# Patient Record
Sex: FEMALE | Race: ASIAN | Employment: OTHER | URBAN - METROPOLITAN AREA
[De-identification: names, ages, dates, MRNs, and addresses within clinical notes are randomized per-mention and may not be internally consistent; named-entity substitution may affect disease eponyms.]

---

## 2017-01-06 ENCOUNTER — OFFICE VISIT (OUTPATIENT)
Dept: RHEUMATOLOGY | Facility: CLINIC | Age: 67
End: 2017-01-06

## 2017-01-06 VITALS
DIASTOLIC BLOOD PRESSURE: 84 MMHG | HEART RATE: 76 BPM | WEIGHT: 138 LBS | RESPIRATION RATE: 16 BRPM | SYSTOLIC BLOOD PRESSURE: 122 MMHG | BODY MASS INDEX: 28 KG/M2

## 2017-01-06 DIAGNOSIS — D46.9 MYELODYSPLASTIC SYNDROME (HCC): ICD-10-CM

## 2017-01-06 DIAGNOSIS — M06.09 SERONEGATIVE RHEUMATOID ARTHRITIS OF MULTIPLE SITES (HCC): Primary | ICD-10-CM

## 2017-01-06 DIAGNOSIS — L30.9 DERMATITIS: ICD-10-CM

## 2017-01-06 DIAGNOSIS — R60.0 BILATERAL LEG EDEMA: ICD-10-CM

## 2017-01-06 PROCEDURE — 99214 OFFICE O/P EST MOD 30 MIN: CPT | Performed by: INTERNAL MEDICINE

## 2017-01-06 RX ORDER — PREDNISONE 1 MG/1
TABLET ORAL
Qty: 100 TABLET | Refills: 3 | Status: SHIPPED | OUTPATIENT
Start: 2017-01-06 | End: 2017-02-10

## 2017-01-06 RX ORDER — LOSARTAN POTASSIUM AND HYDROCHLOROTHIAZIDE 12.5; 5 MG/1; MG/1
1 TABLET ORAL DAILY
Qty: 30 TABLET | Refills: 3 | Status: SHIPPED | OUTPATIENT
Start: 2017-01-06 | End: 2017-02-10

## 2017-01-06 NOTE — PROGRESS NOTES
[facility]  Dr. Mercedes Gill Progress Note     Subjective:   Homer Diehl is a(n) 78year old female. Current complaints: Patient presents with: Follow - Up: Pt here for one month f/u.  Pt's mouth sore is better but now has bilateral lower leg/ankle/feet rheumatoid arthritis is stable. However a new problem is present, there is swelling in the lower legs with a lower leg skin rash.   The skin rash might represent some type of allergic reaction or something else called vasculitis which means inflamed blood

## 2017-01-06 NOTE — PATIENT INSTRUCTIONS
Currently the rheumatoid arthritis is stable. However a new problem is present, there is swelling in the lower legs with a lower leg skin rash.   The skin rash might represent some type of allergic reaction or something else called vasculitis which means i

## 2017-02-07 ENCOUNTER — TELEPHONE (OUTPATIENT)
Dept: RHEUMATOLOGY | Facility: CLINIC | Age: 67
End: 2017-02-07

## 2017-02-10 ENCOUNTER — OFFICE VISIT (OUTPATIENT)
Dept: RHEUMATOLOGY | Facility: CLINIC | Age: 67
End: 2017-02-10

## 2017-02-10 VITALS
DIASTOLIC BLOOD PRESSURE: 62 MMHG | SYSTOLIC BLOOD PRESSURE: 102 MMHG | RESPIRATION RATE: 12 BRPM | WEIGHT: 142.5 LBS | BODY MASS INDEX: 29 KG/M2 | HEART RATE: 68 BPM

## 2017-02-10 DIAGNOSIS — M06.00 SERONEGATIVE RHEUMATOID ARTHRITIS (HCC): ICD-10-CM

## 2017-02-10 DIAGNOSIS — M06.09 SERONEGATIVE RHEUMATOID ARTHRITIS OF MULTIPLE SITES (HCC): Primary | ICD-10-CM

## 2017-02-10 PROCEDURE — 99213 OFFICE O/P EST LOW 20 MIN: CPT | Performed by: INTERNAL MEDICINE

## 2017-02-10 RX ORDER — LOSARTAN POTASSIUM AND HYDROCHLOROTHIAZIDE 12.5; 5 MG/1; MG/1
1 TABLET ORAL DAILY
Qty: 90 TABLET | Refills: 3 | Status: SHIPPED | OUTPATIENT
Start: 2017-02-10 | End: 2018-11-09 | Stop reason: ALTCHOICE

## 2017-02-10 RX ORDER — PREDNISONE 1 MG/1
TABLET ORAL
Qty: 180 TABLET | Refills: 3 | Status: SHIPPED | OUTPATIENT
Start: 2017-02-10 | End: 2017-10-18

## 2017-02-10 RX ORDER — FOLIC ACID 1 MG/1
2 TABLET ORAL
Qty: 180 TABLET | Refills: 4 | Status: SHIPPED | OUTPATIENT
Start: 2017-02-10 | End: 2018-02-19

## 2017-02-10 NOTE — PROGRESS NOTES
[facility]  Dr. Huizar Qualia Progress Note     Subjective:   Melva Diaz is a(n) 78year old female. Current complaints: Patient presents with: Follow - Up: seronegative RA f/u. Pt states 'is feeling much better.  Swelling/redness has gone down in lower hemoglobin. Blood sugar was slightly borderline at 141. Continue eat a well-balanced diet and do not overeat your sugar. Return to see Dr. Gaye Luna in 3 months.       Hillary Kerr MD [id number]/77/6450 2:54 PM

## 2017-02-10 NOTE — PATIENT INSTRUCTIONS
Current plan is to take prednisone 5 mg tablets 2 per day. This is for treatment of arthritis. Take losartan 50 mg plus hydrochlorothiazide 1 per day for treatment of blood pressure.   Take the folic acid 1 mg tablets 2 daily which helps for vitamin defic

## 2017-10-18 ENCOUNTER — OFFICE VISIT (OUTPATIENT)
Dept: RHEUMATOLOGY | Facility: CLINIC | Age: 67
End: 2017-10-18

## 2017-10-18 ENCOUNTER — TELEPHONE (OUTPATIENT)
Dept: RHEUMATOLOGY | Facility: CLINIC | Age: 67
End: 2017-10-18

## 2017-10-18 VITALS
HEART RATE: 68 BPM | BODY MASS INDEX: 33 KG/M2 | DIASTOLIC BLOOD PRESSURE: 82 MMHG | WEIGHT: 163 LBS | RESPIRATION RATE: 12 BRPM | SYSTOLIC BLOOD PRESSURE: 142 MMHG

## 2017-10-18 DIAGNOSIS — D46.9 MYELODYSPLASTIC SYNDROME (HCC): ICD-10-CM

## 2017-10-18 DIAGNOSIS — M06.09 SERONEGATIVE RHEUMATOID ARTHRITIS OF MULTIPLE SITES (HCC): Primary | ICD-10-CM

## 2017-10-18 PROCEDURE — 99213 OFFICE O/P EST LOW 20 MIN: CPT | Performed by: INTERNAL MEDICINE

## 2017-10-18 RX ORDER — PREDNISONE 1 MG/1
TABLET ORAL
Qty: 180 TABLET | Refills: 3 | Status: SHIPPED | OUTPATIENT
Start: 2017-10-18 | End: 2018-02-19 | Stop reason: ALTCHOICE

## 2017-10-18 RX ORDER — FOLIC ACID 1 MG/1
2 TABLET ORAL
Qty: 180 TABLET | Refills: 4 | Status: CANCELLED | OUTPATIENT
Start: 2017-10-18

## 2017-10-18 NOTE — PROGRESS NOTES
[facility]  Dr. Mercedes Gill Progress Note     Subjective:   Homer Diehl is a(n) 75year old female.    Current complaints: Patient presents with:  Rheumatoid Arthritis: LOV Pt c/o left swollen thumb, left swollen middle finger, right wrist swelling  Swelli for muscle cramps in your lower leg as needed. The atorvastatin is used to lower the cholesterol. You should also follow a low-fat diet. The Metformin helps to control the blood sugar take that daily.   Return to see Dr. Karlee Aburto in 4 months unless there i

## 2017-10-18 NOTE — PATIENT INSTRUCTIONS
Current advice is to continue on prednisone 5 mg twice a day to treat the arthritis. Also use the naproxen 500 mg twice a day for arthritis from Dr. Jazmin Tinoco. Finish the vitamin folic acid 1 mg per day but do not renew it once finished.   Folic acid was meant

## 2017-10-18 NOTE — TELEPHONE ENCOUNTER
Tried to add this pharmacy to Pt acct and would not let me  [address] [id number] Phone: [phone] Fax: [phone]

## 2018-02-19 ENCOUNTER — APPOINTMENT (OUTPATIENT)
Dept: LAB | Age: 68
End: 2018-02-19
Attending: INTERNAL MEDICINE
Payer: MEDICARE

## 2018-02-19 ENCOUNTER — OFFICE VISIT (OUTPATIENT)
Dept: RHEUMATOLOGY | Facility: CLINIC | Age: 68
End: 2018-02-19

## 2018-02-19 VITALS
DIASTOLIC BLOOD PRESSURE: 80 MMHG | HEIGHT: 60 IN | BODY MASS INDEX: 31.02 KG/M2 | RESPIRATION RATE: 20 BRPM | HEART RATE: 78 BPM | SYSTOLIC BLOOD PRESSURE: 180 MMHG | WEIGHT: 158 LBS

## 2018-02-19 DIAGNOSIS — M06.09 SERONEGATIVE RHEUMATOID ARTHRITIS OF MULTIPLE SITES (HCC): Primary | ICD-10-CM

## 2018-02-19 DIAGNOSIS — Z79.4 TYPE 2 DIABETES MELLITUS WITHOUT COMPLICATION, WITH LONG-TERM CURRENT USE OF INSULIN (HCC): ICD-10-CM

## 2018-02-19 DIAGNOSIS — M06.00 SERONEGATIVE RHEUMATOID ARTHRITIS (HCC): ICD-10-CM

## 2018-02-19 DIAGNOSIS — E11.9 TYPE 2 DIABETES MELLITUS WITHOUT COMPLICATION, WITH LONG-TERM CURRENT USE OF INSULIN (HCC): ICD-10-CM

## 2018-02-19 DIAGNOSIS — M06.09 SERONEGATIVE RHEUMATOID ARTHRITIS OF MULTIPLE SITES (HCC): ICD-10-CM

## 2018-02-19 LAB
ALBUMIN SERPL-MCNC: 3.6 G/DL (ref 3.5–4.8)
ALP LIVER SERPL-CCNC: 113 U/L (ref 55–142)
ALT SERPL-CCNC: 40 U/L (ref 14–54)
AST SERPL-CCNC: 33 U/L (ref 15–41)
BASOPHILS # BLD AUTO: 0.02 X10(3) UL (ref 0–0.1)
BASOPHILS NFR BLD AUTO: 0.4 %
BILIRUB SERPL-MCNC: 0.7 MG/DL (ref 0.1–2)
BUN BLD-MCNC: 22 MG/DL (ref 8–20)
C-REACTIVE PROTEIN: 0.4 MG/DL (ref ?–1)
CALCIUM BLD-MCNC: 10.1 MG/DL (ref 8.3–10.3)
CHLORIDE: 110 MMOL/L (ref 101–111)
CO2: 23 MMOL/L (ref 22–32)
CREAT BLD-MCNC: 0.96 MG/DL (ref 0.55–1.02)
EOSINOPHIL # BLD AUTO: 0.08 X10(3) UL (ref 0–0.3)
EOSINOPHIL NFR BLD AUTO: 1.4 %
ERYTHROCYTE [DISTWIDTH] IN BLOOD BY AUTOMATED COUNT: 12.9 % (ref 11.5–16)
GLUCOSE BLD-MCNC: 96 MG/DL (ref 70–99)
HCT VFR BLD AUTO: 36.8 % (ref 34–50)
HGB BLD-MCNC: 11.7 G/DL (ref 12–16)
IMMATURE GRANULOCYTE COUNT: 0.01 X10(3) UL (ref 0–1)
IMMATURE GRANULOCYTE RATIO %: 0.2 %
LYMPHOCYTES # BLD AUTO: 1.53 X10(3) UL (ref 0.9–4)
LYMPHOCYTES NFR BLD AUTO: 27.2 %
M PROTEIN MFR SERPL ELPH: 8.3 G/DL (ref 6.1–8.3)
MCH RBC QN AUTO: 29.3 PG (ref 27–33.2)
MCHC RBC AUTO-ENTMCNC: 31.8 G/DL (ref 31–37)
MCV RBC AUTO: 92.2 FL (ref 81–100)
MONOCYTES # BLD AUTO: 0.62 X10(3) UL (ref 0.1–1)
MONOCYTES NFR BLD AUTO: 11 %
NEUTROPHIL ABS PRELIM: 3.36 X10 (3) UL (ref 1.3–6.7)
NEUTROPHILS # BLD AUTO: 3.36 X10(3) UL (ref 1.3–6.7)
NEUTROPHILS NFR BLD AUTO: 59.8 %
PLATELET # BLD AUTO: 105 10(3)UL (ref 150–450)
POTASSIUM SERPL-SCNC: 4 MMOL/L (ref 3.6–5.1)
RBC # BLD AUTO: 3.99 X10(6)UL (ref 3.8–5.1)
RED CELL DISTRIBUTION WIDTH-SD: 43.3 FL (ref 35.1–46.3)
RHEUMATOID FACT SERPL-ACNC: 15 IU/ML (ref ?–15)
SED RATE-ML: 75 MM/HR (ref 0–25)
SODIUM SERPL-SCNC: 141 MMOL/L (ref 136–144)
WBC # BLD AUTO: 5.6 X10(3) UL (ref 4–13)

## 2018-02-19 PROCEDURE — 86431 RHEUMATOID FACTOR QUANT: CPT | Performed by: INTERNAL MEDICINE

## 2018-02-19 PROCEDURE — 99214 OFFICE O/P EST MOD 30 MIN: CPT | Performed by: INTERNAL MEDICINE

## 2018-02-19 PROCEDURE — 86140 C-REACTIVE PROTEIN: CPT | Performed by: INTERNAL MEDICINE

## 2018-02-19 PROCEDURE — 86038 ANTINUCLEAR ANTIBODIES: CPT | Performed by: INTERNAL MEDICINE

## 2018-02-19 PROCEDURE — 85652 RBC SED RATE AUTOMATED: CPT | Performed by: INTERNAL MEDICINE

## 2018-02-19 PROCEDURE — 85025 COMPLETE CBC W/AUTO DIFF WBC: CPT | Performed by: INTERNAL MEDICINE

## 2018-02-19 PROCEDURE — 80053 COMPREHEN METABOLIC PANEL: CPT | Performed by: INTERNAL MEDICINE

## 2018-02-19 PROCEDURE — 86200 CCP ANTIBODY: CPT

## 2018-02-19 RX ORDER — AMLODIPINE BESYLATE 5 MG/1
5 TABLET ORAL DAILY
COMMUNITY
End: 2019-04-25

## 2018-02-19 RX ORDER — INSULIN ASPART 100 [IU]/ML
INJECTION, SOLUTION INTRAVENOUS; SUBCUTANEOUS
COMMUNITY

## 2018-02-19 RX ORDER — MELOXICAM 15 MG/1
15 TABLET ORAL DAILY
Qty: 30 TABLET | Refills: 3 | Status: SHIPPED | OUTPATIENT
Start: 2018-02-19 | End: 2018-04-02

## 2018-02-19 RX ORDER — FOLIC ACID 1 MG/1
1 TABLET ORAL
Qty: 90 TABLET | Refills: 3 | Status: SHIPPED | OUTPATIENT
Start: 2018-02-19 | End: 2018-11-09

## 2018-02-19 RX ORDER — METHOTREXATE 2.5 MG/1
10 TABLET ORAL WEEKLY
Qty: 20 TABLET | Refills: 3 | Status: SHIPPED | OUTPATIENT
Start: 2018-02-19 | End: 2018-03-05

## 2018-02-19 NOTE — PROGRESS NOTES
[facility]  Dr. Rome Cui Progress Note     Subjective:   Eulogio Bernstein is a(n) 75year old female. Current complaints: Patient presents with:  Rheumatoid Arthritis: 4 month f/u. Pt states bilateral hands worsen with pain/swelling-unable to .  Blood methotrexate. Plan:   Patient Instructions   Current plan is to discontinue prednisone. Do not use prednisone because it causes diabetes to worsen. For the rheumatoid arthritis use meloxicam 15 mg once a day.   Take the methotrexate 2.5 mg tablets, 4 tab

## 2018-02-19 NOTE — PATIENT INSTRUCTIONS
Current plan is to discontinue prednisone. Do not use prednisone because it causes diabetes to worsen. For the rheumatoid arthritis use meloxicam 15 mg once a day. Take the methotrexate 2.5 mg tablets, 4 tablets once a week.   Take all 4 tablets at once

## 2018-02-21 LAB — ANA SCREEN: NEGATIVE

## 2018-03-01 NOTE — LETTER
OPIOID TREATMENT AGREEMENT    For Pain Management      Please read each statement, initial at the bottom of each page, and sign the last page to indicate your agreement with this form. If you have any questions about any information in this form or the opioid treatment plan, please request immediate clarification from your physician or health care provider.     The purpose of this agreement is to give you information about the medications you will be taking for pain management and to assure that you and your physician/health care provider comply with state and federal regulations concerning the prescribing of controlled substances. A trial of opioid therapy can be considered for moderate to severe pain with the intent of reducing pain and increasing function. The physician’s goal is for you to have the best quality of life possible given the reality of your clinical condition. The success of treatment depends on mutual trust and honesty in the physician/patient relationship and full agreement and understanding of the risks and benefits of using opioids to treat pain.    I agree to use opioids (morphine-like drugs) as part of my treatment for chronic pain.  I understand that these drugs can be effective, but have a high potential for misuse and are therefore closely controlled by the local, state, and federal government. I understand that my physician/health care provider is prescribing such medication to help manage my pain, and I agree to the following conditions as part of my treatment plan    I am responsible for my pain medications.  I agree to take the medication only as prescribed.    I understand that increasing my dose without the close supervision of my physician could    lead to drug overdose causing severe sedation and respiratory depression and death.    I understand that decreasing or stopping my medication without the close supervision of my physician can lead to withdrawal. Withdrawal symptoms can  Procedure start   include yawning, sweating, watery eyes, runny nose, anxiety, tremors, aching muscles, hot and cold flashes, “goose bumps”, abdominal cramps, and diarrhea.  These symptoms can occur 24-48 hours after the last dose and can last up to 3 weeks.    I will not request or accept controlled substance medication from any other physician or individual while I am receiving such medication from my physician/health care provider at the clinic.    I acknowledge that there are side effects with opioid therapy, and I understand it is my responsibility to notify my physician/health care provider for any side effect that continue or are severe (i.e., difficulty breathing, slow heart rate, sedation, confusion).  I am also responsible for notifying my pain physician immediately if I need to visit another physician or need to visit an emergency room due to pain, of if I become pregnant.    I understand that the opioid medication is strictly for my own use and I agree not to give or sell my medication to others because it may endanger another person’s health and is against the law.    I will inform my physician of all medications I am taking, including herbal remedies.  Medications like Valium or Ativan; sedatives such as Soma, Xanax, Fiorinal; antihistamines like Benadryl; herbal remedies, alcohol, and cough syrup containing alcohol, codeine, or hydrocodone can interact with opioids and produce serious side effects.    I understand I will be expected to return to the clinic as instructed by my provider during the time any of my medication is being adjusted.    I understand that any evidence of drug hoarding, acquisition of any opioid medication or adjunctive analgesia from other physicians (which includes emergency rooms), uncontrolled dose escalation or reduction, loss of prescriptions or failure to follow agreement may result in change to the treatment plan, referral to the Medication Assisted Therapy Program, an may result in  termination of the physician/patient relationship.    I will not use any illicit substances, such as cocaine, marijuana, etc. while taking these medications.  I understand that use of any illicit substances while taking these medications may result in a change to my treatment plan, referral to the Medication Assisted Therapy Clinic, and may result in termination of the physician/patient relationship.    I understand that I should not consume alcohol while taking these medications because the use of alcohol together with opioid medications is warned against.    I am responsible for my opioid prescriptions.  I understand that:    Refill prescriptions can be written for a one month supply and increased to a maximum of two months at the discretion of the provider.    It is my responsibility to schedule appointments for the next opioid refill to ensure I do not run out of medications.    I am responsible for keeping my prescriptions and pain medications in a safe and secure place, such as a locked cabinet or safe.  I am expected to protect my medications from loss or theft.  I am responsible for taking the medication in the dose prescribed and for keeping track of the amount remaining.  If my medication is stolen, I will report this to my local police department and obtain a stolen item report.  I will then report the stolen medication to my physician.  If my medications are lost, misplaced, or stolen, my physician may choose not to replace the medications.    Refills issued by physicians/health care providers in this clinic can only be filled by a pharmacy in the State of [place], even if I am a resident of another state.    Prescriptions for pain medicine or any other prescriptions will be written only during an office visit or during regular office hours.  No refills of any medications during the evening or on weekends.    I must bring back all opioid medications and adjunctive medications prescribed by my physician  in the original containers/bottles at every visit.    Prescriptions will not be written in advance due to vacations, meetings, or other commitments.    If an appointment for a prescription refill is missed, another appointment will be made as soon as possible.  Immediate or emergency appointments will not be possible.    I understand while physical dependence is to be expected after long-term use of opioids, signs of addiction, abuse, or misuse shall prompt the need for substance dependence treatment as well as weaning and detoxification from the opioids.  I further understand the following:    Physical dependence is common to many drugs such as blood pressure medications, anti-seizure medications, and opioids.  It results in biochemical changes such that abruptly stopping these drugs will cause a withdrawal response.  It should be noted that physical dependence does not equal addiction.  A person can be dependent on insulin to treat diabetes or dependent on prednisone (steroids) to treat asthma, but not addicted to the insulin or prednisone.    Addiction is a primary, chronic neurobiologic disease with genetic, psychosocial and environmental factors influencing its development and manifestation.  It is characterized by behavior that includes one or more of the following: impaired control over drug use, compulsive use, continued use despite harm, and cravings.  This means the drug decreases a person’s quality of life.  If a patient exhibits such behavior, the drug will be tapered and the patient will not be a candidate for an opioid trial.  He/she will be referred to an addiction medicine specialist.    Tolerance means a state of adaption in which exposure to the drug induces changes that result in a lessening of one or more of the drug’s effects over time.  The dose of the opioid may have to be adjusted up or down to a dose that produces maximum function and a realistic decrease of the patient’s pain.    If it  appears to my physician/health care provider that there is no improvement in my daily function or quality of life from the controlled substance, I understand my opioids may be discontinued.    If I have a history of alcohol or drug misuse/addiction, I must notify the physician of such history since the treatment with opioids for may increase the possibility of relapse.    I agree and understand that my physician reserves the right to perform random or unannounced urine drug testing.  If requested to provide a urine sample, I agree to cooperate.  If I decide not to provide a urine sample, I understand that my physician may change my treatment plan, including discontinuation of my opioid medications when applicable or complete termination of the physician/patient relationship.  The presence of non-prescribed drug(s) or illicit drug(s) in the urine can be grounds for termination of the physician/patient relationship.  Urine drug testing is not forensic testing, but is done for my benefit as a diagnostic tool and in accordance with certain legal and regulatory guidance on the use of controlled substances to treat pain.    I agree to allow my physician/albertina care provider to contact any health care professional, including behavioral health, family member, pharmacy, legal authority, or regulator agency to obtain or provide information about my care or actions if the physician feels it is necessary.    I understand that non-compliance with the above conditions may result in a re-evaluation of my treatment plan, referral to the Medication [facility]isted Therapy Clinic, discontinuation of opioid therapy, and possible discharge from the clinic.    I agree to work closely with my physician/health care provider to assure the agreed plan of care is followed.    I acknowledge that I have received a copy of this agreement for my records.          I __________________________________________ have read the above information or it has been  read to me.  All my questions regarding the treatment of pain with opioids have been answered to my satisfaction, and I understand all of the conditions of my participation in the opioid treatment plan listed above.  I hereby agree to the conditions listed about and consent to participate in the opioid medication therapy.        ______________________________    ____________    ______________________________              Patient Signature                                  Date                        Patient Printed Name  ______________________________    ____________    ______________________________              Witness Signature                                Date                       Witness Printed Name

## 2018-04-02 ENCOUNTER — OFFICE VISIT (OUTPATIENT)
Dept: RHEUMATOLOGY | Facility: CLINIC | Age: 68
End: 2018-04-02

## 2018-04-02 VITALS
SYSTOLIC BLOOD PRESSURE: 122 MMHG | RESPIRATION RATE: 12 BRPM | DIASTOLIC BLOOD PRESSURE: 78 MMHG | BODY MASS INDEX: 30 KG/M2 | WEIGHT: 153 LBS

## 2018-04-02 DIAGNOSIS — M06.09 SERONEGATIVE RHEUMATOID ARTHRITIS OF MULTIPLE SITES (HCC): Primary | ICD-10-CM

## 2018-04-02 PROCEDURE — 99213 OFFICE O/P EST LOW 20 MIN: CPT | Performed by: INTERNAL MEDICINE

## 2018-04-02 RX ORDER — FOLIC ACID 1 MG/1
1 TABLET ORAL DAILY
Qty: 30 TABLET | Refills: 3 | Status: SHIPPED | OUTPATIENT
Start: 2018-04-02 | End: 2018-08-07

## 2018-04-02 RX ORDER — MELOXICAM 15 MG/1
15 TABLET ORAL DAILY
Qty: 30 TABLET | Refills: 3 | Status: SHIPPED | OUTPATIENT
Start: 2018-04-02 | End: 2018-08-07

## 2018-04-02 NOTE — PATIENT INSTRUCTIONS
Current plan, due to low morning sugars of 54, cut back in insulin Levemir from 30 units to 28 units per day,. Normal morning sugar/glucose should be . Low morning sugar will make you shakey, dizzy, and tired.   For the Rheumatoid Arthritis take  Me

## 2018-04-02 NOTE — PROGRESS NOTES
[facility]  Dr. Viola Lopez Progress Note     Subjective:   Nicole Dalton is a(n) 75year old female. Current complaints: Patient presents with:  Rheumatoid Arthritis: Pt states 'blood sugar is better since stopping prednisone.  Is having some nausea right

## 2018-08-07 ENCOUNTER — OFFICE VISIT (OUTPATIENT)
Dept: RHEUMATOLOGY | Facility: CLINIC | Age: 68
End: 2018-08-07
Payer: MEDICARE

## 2018-08-07 ENCOUNTER — APPOINTMENT (OUTPATIENT)
Dept: LAB | Age: 68
End: 2018-08-07
Attending: INTERNAL MEDICINE
Payer: MEDICARE

## 2018-08-07 VITALS
RESPIRATION RATE: 16 BRPM | WEIGHT: 135 LBS | DIASTOLIC BLOOD PRESSURE: 72 MMHG | HEART RATE: 72 BPM | BODY MASS INDEX: 26 KG/M2 | SYSTOLIC BLOOD PRESSURE: 124 MMHG

## 2018-08-07 DIAGNOSIS — M19.032 ARTHRITIS OF LEFT WRIST: ICD-10-CM

## 2018-08-07 DIAGNOSIS — M06.09 SERONEGATIVE RHEUMATOID ARTHRITIS OF MULTIPLE SITES (HCC): Primary | ICD-10-CM

## 2018-08-07 DIAGNOSIS — D46.9 MYELODYSPLASTIC SYNDROME (HCC): ICD-10-CM

## 2018-08-07 LAB
ALBUMIN SERPL-MCNC: 4.2 G/DL (ref 3.5–4.8)
ALBUMIN/GLOB SERPL: 0.8 {RATIO} (ref 1–2)
ALP LIVER SERPL-CCNC: 134 U/L (ref 55–142)
ALT SERPL-CCNC: 33 U/L (ref 14–54)
ANION GAP SERPL CALC-SCNC: 10 MMOL/L (ref 0–18)
AST SERPL-CCNC: 30 U/L (ref 15–41)
BASOPHILS # BLD AUTO: 0.05 X10(3) UL (ref 0–0.1)
BASOPHILS NFR BLD AUTO: 0.8 %
BILIRUB SERPL-MCNC: 0.7 MG/DL (ref 0.1–2)
BUN BLD-MCNC: 25 MG/DL (ref 8–20)
BUN/CREAT SERPL: 19.8 (ref 10–20)
CALCIUM BLD-MCNC: 9.8 MG/DL (ref 8.3–10.3)
CHLORIDE SERPL-SCNC: 108 MMOL/L (ref 101–111)
CO2 SERPL-SCNC: 23 MMOL/L (ref 22–32)
CREAT BLD-MCNC: 1.26 MG/DL (ref 0.55–1.02)
EOSINOPHIL # BLD AUTO: 0.17 X10(3) UL (ref 0–0.3)
EOSINOPHIL NFR BLD AUTO: 2.7 %
ERYTHROCYTE [DISTWIDTH] IN BLOOD BY AUTOMATED COUNT: 13.2 % (ref 11.5–16)
GLOBULIN PLAS-MCNC: 5 G/DL (ref 2.5–3.7)
GLUCOSE BLD-MCNC: 105 MG/DL (ref 70–99)
HCT VFR BLD AUTO: 38.8 % (ref 34–50)
HGB BLD-MCNC: 12.1 G/DL (ref 12–16)
IMMATURE GRANULOCYTE COUNT: 0.01 X10(3) UL (ref 0–1)
IMMATURE GRANULOCYTE RATIO %: 0.2 %
LYMPHOCYTES # BLD AUTO: 2.4 X10(3) UL (ref 0.9–4)
LYMPHOCYTES NFR BLD AUTO: 38 %
M PROTEIN MFR SERPL ELPH: 9.2 G/DL (ref 6.1–8.3)
MCH RBC QN AUTO: 30.5 PG (ref 27–33.2)
MCHC RBC AUTO-ENTMCNC: 31.2 G/DL (ref 31–37)
MCV RBC AUTO: 97.7 FL (ref 81–100)
MONOCYTES # BLD AUTO: 0.67 X10(3) UL (ref 0.1–1)
MONOCYTES NFR BLD AUTO: 10.6 %
NEUTROPHIL ABS PRELIM: 3.02 X10 (3) UL (ref 1.3–6.7)
NEUTROPHILS # BLD AUTO: 3.02 X10(3) UL (ref 1.3–6.7)
NEUTROPHILS NFR BLD AUTO: 47.7 %
OSMOLALITY SERPL CALC.SUM OF ELEC: 297 MOSM/KG (ref 275–295)
PLATELET # BLD AUTO: 132 10(3)UL (ref 150–450)
POTASSIUM SERPL-SCNC: 4.2 MMOL/L (ref 3.6–5.1)
RBC # BLD AUTO: 3.97 X10(6)UL (ref 3.8–5.1)
RED CELL DISTRIBUTION WIDTH-SD: 47.6 FL (ref 35.1–46.3)
SED RATE-ML: 57 MM/HR (ref 0–25)
SODIUM SERPL-SCNC: 141 MMOL/L (ref 136–144)
WBC # BLD AUTO: 6.3 X10(3) UL (ref 4–13)

## 2018-08-07 PROCEDURE — 85652 RBC SED RATE AUTOMATED: CPT | Performed by: INTERNAL MEDICINE

## 2018-08-07 PROCEDURE — 99213 OFFICE O/P EST LOW 20 MIN: CPT | Performed by: INTERNAL MEDICINE

## 2018-08-07 PROCEDURE — 80053 COMPREHEN METABOLIC PANEL: CPT | Performed by: INTERNAL MEDICINE

## 2018-08-07 PROCEDURE — 20605 DRAIN/INJ JOINT/BURSA W/O US: CPT | Performed by: INTERNAL MEDICINE

## 2018-08-07 PROCEDURE — 85025 COMPLETE CBC W/AUTO DIFF WBC: CPT | Performed by: INTERNAL MEDICINE

## 2018-08-07 RX ORDER — MELOXICAM 15 MG/1
15 TABLET ORAL DAILY
Qty: 30 TABLET | Refills: 3 | Status: SHIPPED | OUTPATIENT
Start: 2018-08-07 | End: 2018-11-09 | Stop reason: ALTCHOICE

## 2018-08-07 RX ORDER — FOLIC ACID 1 MG/1
1 TABLET ORAL DAILY
Qty: 30 TABLET | Refills: 3 | Status: SHIPPED | OUTPATIENT
Start: 2018-08-07 | End: 2018-11-09

## 2018-08-07 RX ORDER — BACLOFEN 10 MG/1
10 TABLET ORAL 3 TIMES DAILY
COMMUNITY

## 2018-08-07 RX ORDER — GABAPENTIN 300 MG/1
300 CAPSULE ORAL 2 TIMES DAILY
COMMUNITY
End: 2020-11-17

## 2018-08-07 RX ORDER — METHYLPREDNISOLONE ACETATE 40 MG/ML
40 INJECTION, SUSPENSION INTRA-ARTICULAR; INTRALESIONAL; INTRAMUSCULAR; SOFT TISSUE ONCE
Status: COMPLETED | OUTPATIENT
Start: 2018-08-07 | End: 2018-08-07

## 2018-08-07 RX ADMIN — METHYLPREDNISOLONE ACETATE 30 MG: 40 INJECTION, SUSPENSION INTRA-ARTICULAR; INTRALESIONAL; INTRAMUSCULAR; SOFT TISSUE at 11:54:00

## 2018-08-07 NOTE — PROCEDURES
Any consent from the patient, left wrist was cleansed with a combination of Betadine and alcohol wipes. In the left wrist was injected with 30 mg of methylprednisolone and a quarter of a cc of 1% lidocaine at the left ulnar styloid on the dorsal surface.

## 2018-08-07 NOTE — PATIENT INSTRUCTIONS
Increase Methotrexate to 5 tablets per week. Use Folic acid daily. Use Meloxicam 15  mg per day. Today the left wrist was injected with cortisone, so rest the wrist.  Do your lab tests. Return to office 3 months.

## 2018-08-07 NOTE — PROGRESS NOTES
[facility]  Dr. Alan Exon Progress Note     Subjective:   Alejandro Lynn is a(n) 70year old female. Current complaints: Patient presents with:  Rheumatoid Arthritis: 3 month f/u. Rapid 3-high severity. Pt did not do labs.  Pt lost 18 lbs past 3 months-be

## 2018-11-09 ENCOUNTER — OFFICE VISIT (OUTPATIENT)
Dept: RHEUMATOLOGY | Facility: CLINIC | Age: 68
End: 2018-11-09
Payer: MEDICARE

## 2018-11-09 VITALS
RESPIRATION RATE: 20 BRPM | SYSTOLIC BLOOD PRESSURE: 116 MMHG | BODY MASS INDEX: 29.06 KG/M2 | WEIGHT: 148 LBS | HEART RATE: 64 BPM | HEIGHT: 60 IN | DIASTOLIC BLOOD PRESSURE: 72 MMHG

## 2018-11-09 DIAGNOSIS — M06.09 RHEUMATOID ARTHRITIS OF MULTIPLE SITES WITHOUT RHEUMATOID FACTOR (HCC): Primary | ICD-10-CM

## 2018-11-09 DIAGNOSIS — M06.09 SERONEGATIVE RHEUMATOID ARTHRITIS OF MULTIPLE SITES (HCC): ICD-10-CM

## 2018-11-09 DIAGNOSIS — M17.0 PRIMARY OSTEOARTHRITIS OF BOTH KNEES: ICD-10-CM

## 2018-11-09 PROBLEM — M19.032 ARTHRITIS OF LEFT WRIST: Status: RESOLVED | Noted: 2018-08-07 | Resolved: 2018-11-09

## 2018-11-09 PROBLEM — R60.0 BILATERAL LEG EDEMA: Status: RESOLVED | Noted: 2017-01-06 | Resolved: 2018-11-09

## 2018-11-09 PROCEDURE — 99213 OFFICE O/P EST LOW 20 MIN: CPT | Performed by: INTERNAL MEDICINE

## 2018-11-09 RX ORDER — FOLIC ACID 1 MG/1
1 TABLET ORAL DAILY
Qty: 30 TABLET | Refills: 3 | Status: SHIPPED | OUTPATIENT
Start: 2018-11-09 | End: 2019-08-15

## 2018-11-09 NOTE — PROGRESS NOTES
[facility]  Dr. Raghav Kapadia Progress Note     Subjective:   Hieu Coe is a(n) 70year old female. Current complaints: Patient presents with:  Rheumatoid Arthritis: 3 month f/u, no recent labs, Rapid 3 High Severity  Feeling better.   Joint pain - left

## 2018-11-09 NOTE — PATIENT INSTRUCTIONS
Use Methotrexate 5 tablets per week for rheumatoid arthritis. Use Folic acid one per day, 1 mg. Use tylenol as needed. Apply heat to left shoulder and back as needed.    Then apply aspircream salve or Jorge Polanco cream.  Return to office in April   Do labs

## 2019-03-18 RX ORDER — FOLIC ACID 1 MG/1
1 TABLET ORAL DAILY
Qty: 30 TABLET | Refills: 0 | Status: SHIPPED | OUTPATIENT
Start: 2019-03-18 | End: 2019-08-15

## 2019-03-18 NOTE — TELEPHONE ENCOUNTER
LOV 11/9/18  Future Appointments   Date Time Provider Radha Ragsdale   7/75/8675  2:59 PM Janis Watkins MD [facility]

## 2019-04-25 ENCOUNTER — OFFICE VISIT (OUTPATIENT)
Dept: RHEUMATOLOGY | Facility: CLINIC | Age: 69
End: 2019-04-25
Payer: MEDICARE

## 2019-04-25 VITALS
WEIGHT: 155 LBS | BODY MASS INDEX: 30.43 KG/M2 | RESPIRATION RATE: 16 BRPM | HEART RATE: 72 BPM | SYSTOLIC BLOOD PRESSURE: 108 MMHG | HEIGHT: 60 IN | DIASTOLIC BLOOD PRESSURE: 62 MMHG

## 2019-04-25 DIAGNOSIS — M06.09 RHEUMATOID ARTHRITIS OF MULTIPLE SITES WITHOUT RHEUMATOID FACTOR (HCC): ICD-10-CM

## 2019-04-25 DIAGNOSIS — D46.9 MYELODYSPLASTIC SYNDROME (HCC): ICD-10-CM

## 2019-04-25 DIAGNOSIS — M17.0 PRIMARY OSTEOARTHRITIS OF BOTH KNEES: ICD-10-CM

## 2019-04-25 DIAGNOSIS — M06.09 SERONEGATIVE RHEUMATOID ARTHRITIS OF MULTIPLE SITES (HCC): Primary | ICD-10-CM

## 2019-04-25 PROCEDURE — 99214 OFFICE O/P EST MOD 30 MIN: CPT | Performed by: INTERNAL MEDICINE

## 2019-04-25 RX ORDER — LOSARTAN POTASSIUM AND HYDROCHLOROTHIAZIDE 12.5; 5 MG/1; MG/1
1 TABLET ORAL DAILY
COMMUNITY

## 2019-04-25 RX ORDER — INSULIN DEGLUDEC 200 U/ML
INJECTION, SOLUTION SUBCUTANEOUS
Refills: 5 | COMMUNITY
Start: 2019-01-14

## 2019-04-25 RX ORDER — DOCUSATE SODIUM 100 MG/1
100 CAPSULE, LIQUID FILLED ORAL 2 TIMES DAILY
COMMUNITY

## 2019-04-25 RX ORDER — HYDROXYCHLOROQUINE SULFATE 200 MG/1
200 TABLET, FILM COATED ORAL DAILY
Qty: 30 TABLET | Refills: 5 | Status: SHIPPED | OUTPATIENT
Start: 2019-04-25 | End: 2019-12-12

## 2019-04-25 NOTE — PROGRESS NOTES
[facility]  Dr. Tsosie Hodgkins Progress Note     Subjective:   Ed Veliz is a(n) 70year old female. Current complaints: Patient presents with:  Rheumatoid Arthritis: 6 month f/u. Rapid 3-high severity. Labs not done.  c/o bilateral wrist swelling, diffic pain.  Return to office in 3 months, repeat lab tests before next visit. 25 unit visit with greater than half time face-to-face discussing treatment of rheumatoid arthritis and side effects of both methotrexate and Plaquenil.   Kashif Castañeda MD 9/44/

## 2019-04-25 NOTE — PATIENT INSTRUCTIONS
Okay for rheumatoid arthritis which is very active in the wrists and active in the right thumb, increase the methotrexate to 8 tablets/week. On Sunday take 8 tablets. Take 4 tablets in the morning and 4 tablets in the evening every Sunday.   Use folic aci

## 2019-08-15 ENCOUNTER — OFFICE VISIT (OUTPATIENT)
Dept: RHEUMATOLOGY | Facility: CLINIC | Age: 69
End: 2019-08-15
Payer: MEDICARE

## 2019-08-15 VITALS — BODY MASS INDEX: 29.08 KG/M2 | WEIGHT: 158 LBS | RESPIRATION RATE: 18 BRPM | HEIGHT: 62 IN

## 2019-08-15 DIAGNOSIS — M06.09 SERONEGATIVE RHEUMATOID ARTHRITIS OF MULTIPLE SITES (HCC): Primary | ICD-10-CM

## 2019-08-15 DIAGNOSIS — M17.0 PRIMARY OSTEOARTHRITIS OF BOTH KNEES: ICD-10-CM

## 2019-08-15 DIAGNOSIS — D46.9 MYELODYSPLASTIC SYNDROME (HCC): ICD-10-CM

## 2019-08-15 PROCEDURE — 99213 OFFICE O/P EST LOW 20 MIN: CPT | Performed by: INTERNAL MEDICINE

## 2019-08-15 RX ORDER — SULFASALAZINE 500 MG/1
500 TABLET ORAL 2 TIMES DAILY
Qty: 60 TABLET | Refills: 3 | Status: SHIPPED | OUTPATIENT
Start: 2019-08-15 | End: 2019-12-12

## 2019-08-15 RX ORDER — FOLIC ACID 1 MG/1
1 TABLET ORAL DAILY
Qty: 90 TABLET | Refills: 2 | Status: SHIPPED | OUTPATIENT
Start: 2019-08-15 | End: 2020-11-17

## 2019-08-15 RX ORDER — TRAMADOL HYDROCHLORIDE 50 MG/1
50 TABLET ORAL EVERY 12 HOURS PRN
Refills: 5 | COMMUNITY
Start: 2019-08-12 | End: 2020-11-17

## 2019-08-15 NOTE — PATIENT INSTRUCTIONS
Continue to take methotrexate 8 tablets/week for in the morning and 4 in the evening. Along with methotrexate take the folic acid 1 mg a day.   Due to active arthritis in the wrists, add a new medicine called sulfasalazine 500 mg a day to the combination o

## 2019-08-15 NOTE — PROGRESS NOTES
[facility]  Dr. Sushila Silverman Progress Note     Subjective:   Tyson Celeste is a(n) 78year old female.    Current complaints: Patient presents with:  Rheumatoid Arthritis: lov 4/25/19-labs pending-done today- pt had left eye catart surgery last week, right ey

## 2019-12-12 ENCOUNTER — OFFICE VISIT (OUTPATIENT)
Dept: RHEUMATOLOGY | Facility: CLINIC | Age: 69
End: 2019-12-12
Payer: MEDICARE

## 2019-12-12 VITALS
WEIGHT: 152 LBS | DIASTOLIC BLOOD PRESSURE: 54 MMHG | HEART RATE: 84 BPM | HEIGHT: 60 IN | RESPIRATION RATE: 18 BRPM | SYSTOLIC BLOOD PRESSURE: 102 MMHG | BODY MASS INDEX: 29.84 KG/M2

## 2019-12-12 DIAGNOSIS — S66.812A RUPTURE OF EXTENSOR TENDON OF LEFT HAND, INITIAL ENCOUNTER: ICD-10-CM

## 2019-12-12 DIAGNOSIS — M06.09 RHEUMATOID ARTHRITIS OF MULTIPLE SITES WITHOUT RHEUMATOID FACTOR (HCC): ICD-10-CM

## 2019-12-12 DIAGNOSIS — M06.09 SERONEGATIVE RHEUMATOID ARTHRITIS OF MULTIPLE SITES (HCC): Primary | ICD-10-CM

## 2019-12-12 PROCEDURE — 99214 OFFICE O/P EST MOD 30 MIN: CPT | Performed by: INTERNAL MEDICINE

## 2019-12-12 RX ORDER — SULFASALAZINE 500 MG/1
1000 TABLET ORAL 2 TIMES DAILY
Qty: 120 TABLET | Refills: 3 | Status: SHIPPED | OUTPATIENT
Start: 2019-12-12 | End: 2020-03-17

## 2019-12-12 RX ORDER — HYDROXYCHLOROQUINE SULFATE 200 MG/1
200 TABLET, FILM COATED ORAL DAILY
Qty: 30 TABLET | Refills: 5 | Status: SHIPPED | OUTPATIENT
Start: 2019-12-12 | End: 2020-03-17

## 2019-12-12 NOTE — PATIENT INSTRUCTIONS
In the left hand there has been no rupture of the extensor tendons to the fourth and fifth fingers. The cause of the rupture or damage to the extensor tendons is the rheumatoid arthritis.   The only way to fix the left fourth and fifth fingers and make the

## 2019-12-12 NOTE — PROGRESS NOTES
[facility]  Dr. Sushila Silverman Progress Note     Subjective:   Tyson Celeste is a(n) 78year old female.    Current complaints: Patient presents with:  Rheumatoid Arthritis: 4 month f/u, no recent labs, Rapid 3 high severity, not feeling well all today, develop to 2 tablets twice a day. Continue the hydroxychloroquine/Plaquenil at 200 mg once a day.   In the future if the rheumatoid stays very active we will have to consider going to the infusion center at [facility] and received either Gricel Rodriguez infusion

## 2019-12-13 ENCOUNTER — TELEPHONE (OUTPATIENT)
Dept: RHEUMATOLOGY | Facility: CLINIC | Age: 69
End: 2019-12-13

## 2020-03-17 ENCOUNTER — OFFICE VISIT (OUTPATIENT)
Dept: RHEUMATOLOGY | Facility: CLINIC | Age: 70
End: 2020-03-17
Payer: MEDICARE

## 2020-03-17 VITALS
HEART RATE: 68 BPM | RESPIRATION RATE: 18 BRPM | HEIGHT: 60 IN | BODY MASS INDEX: 29.45 KG/M2 | WEIGHT: 150 LBS | SYSTOLIC BLOOD PRESSURE: 110 MMHG | DIASTOLIC BLOOD PRESSURE: 54 MMHG

## 2020-03-17 DIAGNOSIS — M17.0 PRIMARY OSTEOARTHRITIS OF BOTH KNEES: ICD-10-CM

## 2020-03-17 DIAGNOSIS — D46.9 MYELODYSPLASTIC SYNDROME (HCC): ICD-10-CM

## 2020-03-17 DIAGNOSIS — M06.09 SERONEGATIVE RHEUMATOID ARTHRITIS OF MULTIPLE SITES (HCC): ICD-10-CM

## 2020-03-17 DIAGNOSIS — S66.812D RUPTURE OF EXTENSOR TENDON OF LEFT HAND, SUBSEQUENT ENCOUNTER: ICD-10-CM

## 2020-03-17 DIAGNOSIS — M06.09 RHEUMATOID ARTHRITIS OF MULTIPLE SITES WITHOUT RHEUMATOID FACTOR (HCC): ICD-10-CM

## 2020-03-17 DIAGNOSIS — E11.69 DIABETES MELLITUS TYPE 2 IN OBESE (HCC): Primary | ICD-10-CM

## 2020-03-17 DIAGNOSIS — E66.9 DIABETES MELLITUS TYPE 2 IN OBESE (HCC): Primary | ICD-10-CM

## 2020-03-17 PROCEDURE — 99214 OFFICE O/P EST MOD 30 MIN: CPT | Performed by: INTERNAL MEDICINE

## 2020-03-17 RX ORDER — SULFASALAZINE 500 MG/1
1000 TABLET ORAL 2 TIMES DAILY
Qty: 120 TABLET | Refills: 3 | Status: SHIPPED | OUTPATIENT
Start: 2020-03-17 | End: 2020-11-17

## 2020-03-17 RX ORDER — FOLIC ACID 1 MG/1
1 TABLET ORAL DAILY
Qty: 90 TABLET | Refills: 2 | Status: CANCELLED | OUTPATIENT
Start: 2020-03-17

## 2020-03-17 RX ORDER — HYDROXYCHLOROQUINE SULFATE 200 MG/1
200 TABLET, FILM COATED ORAL DAILY
Qty: 90 TABLET | Refills: 3 | Status: SHIPPED | OUTPATIENT
Start: 2020-03-17 | End: 2020-11-17

## 2020-03-17 NOTE — PATIENT INSTRUCTIONS
Current plan continue on methotrexate 10 tablets/week 5 in the morning on Sunday and 5 in the afternoon on Sunday. Take multivitamin or folic acid with the methotrexate. Use sulfasalazine 500 mg 2 tablets twice a day. Use Plaquenil 200 mg once a day.   I

## 2020-03-17 NOTE — PROGRESS NOTES
[address] [place] [address] 
 
 
 [address] [facility] 83. 
[phone] Patient: Nadia Chu MRN: [id number] :2017 Visit Information Date & Time Provider Department Dept. Phone Encounter #  
 3/8/2018 11:00 AM Lyndall Meckel, R [place] 14 [id number] Follow-up Instructions Return in about 1 year (around 3/8/2019) for 2 week follow up . Your Appointments 2018  3:30 PM  
Nurse Visit with Amrit Vasquez ([facility]) Appt Note: shots [address] [address]  
[phone]  
  
   
 [address] [address] Upcoming Health Maintenance Date Due PEDIATRIC DENTIST REFERRAL 2016 Hepatitis B Peds Age 0-18 (4 of 4 - 4 Dose Series) 2016 Influenza Peds 6M-8Y (2 of 2) 2018 Hepatitis A Peds Age 1-18 (2 of 2 - Standard Series) 2018 Varicella Peds Age 1-18 (2 of 2 - 2 Dose Childhood Series) 2020 IPV Peds Age 0-18 (4 of 4 - All-IPV Series) 2020 MMR Peds Age 1-18 (2 of 2) 2020 DTaP/Tdap/Td series (5 - DTaP) 2020 MCV through Age 25 (1 of 2) 2027 Allergies as of 3/8/2018  Review Complete On: 3/8/2018 By: Lyndall Meckel, MD  
 No Known Allergies Current Immunizations  Reviewed on 10/18/2017 Name Date DTaP 2016 DTaP-Hep B-IPV 2016, 2016 [id number] 2017 Hep A Vaccine 2 Dose Schedule (Ped/Adol) 10/18/2017, 2017 Hep B Vaccine 2016 Hep B, Adol/Ped 2016 Hib (PRP-T) 2016, 2016, 2016 Influenza Vaccine (Quad) PF 3/8/2018 MMR 2017 Pneumococcal Conjugate (PCV-13) 2017, 2016, 2016, 2016 Rotavirus, Live, Monovalent Vaccine 2016 Rotavirus, Live, Pentavalent Vaccine 2016 TB Skin Test (PPD) Intradermal 2017 Varicella Virus Vaccine 2017 Not reviewed this visit [facility]  Dr. Huizar Qualia Progress Note     Subjective:   Melva Diaz is a(n) 78year old female.    Current complaints: Patient presents with:  Rheumatoid Arthritis: 3 month f/u, no recent labs, Rapid 3 moderate severity, has insomnia, but otherwise ok You Were Diagnosed With   
  
 Codes Comments Acute rhinitis, unspecified type    -  Primary ICD-10-CM: Marco Hdz ICD-9-CM: 755 Encounter for routine child health examination with abnormal findings     ICD-10-CM: Z00.121 ICD-9-CM: V20.2 Encounter for immunization     ICD-10-CM: X89 ICD-9-CM: V03.89 Cough     ICD-10-CM: R05 ICD-9-CM: 786.2 Acute bacterial conjunctivitis, unspecified laterality     ICD-10-CM: H10.30 ICD-9-CM: 372.03 Strabismus     ICD-10-CM: H50.9 ICD-9-CM: 378.9 Vitals Temp Height(growth percentile) Weight(growth percentile) HC BMI Smoking Status 98 °F (36.7 °C) (Axillary) (!) 2' 10\" (0.864 m) (49 %, Z= -0.02)* 26 lb 4 oz (11.9 kg) (38 %, Z= -0.32)* 48.3 cm (66 %, Z= 0.41) 15.97 kg/m2 (40 %, Z= -0.25)* Never Smoker *Growth percentiles are based on [facility] 2-20 Years data. Growth percentiles are based on [facility] 0-36 Months data. Vitals History BMI and BSA Data Body Mass Index Body Surface Area 15.97 kg/m 2 0.53 m 2 Preferred Pharmacy Pharmacy Name Phone [address] [phone] Your Updated Medication List  
  
   
This list is accurate as of 3/8/18 11:53 AM.  Always use your most recent med list.  
  
  
  
  
 acetaminophen 160 mg/5 mL liquid Commonly known as:  TYLENOL Take 4.4 mL by mouth every four (4) hours as needed for Pain.  
  
 amoxicillin 400 mg/5 mL suspension Commonly known as:  AMOXIL Take 3.7 mL by mouth two (2) times a day for 10 days. Indications: Acute Otitis Media  
  
 cetirizine 1 mg/mL solution Commonly known as:  ZYRTEC Take 2.5 mL by mouth daily. Indications: ALLERGIC RHINITIS Prescriptions Sent to Pharmacy Refills  
 amoxicillin (AMOXIL) 400 mg/5 mL suspension 0 Sig: Take 3.7 mL by mouth two (2) times a day for 10 days. Indications: Acute Otitis Media  Class: Normal  
 be stretched forward. The only way to fix this is surgery. You would have to see a orthopedic hand specialist.  Use extra strength Tylenol 2 tablets twice a day for mild pain. For severe pain take a tramadol.   For itching use triamcinolone cream applied Pharmacy: [facility] [address] LAURY NÚ[address]EZ [address] [facility] #: [phone] Route: Oral  
  
We Performed the Following INFLUENZA VIRUS VAC QUAD,SPLIT,PRESV FREE SYRINGE IM [id number] CPT(R)] [place] IM ADM THRU 18YR ANY RTE 1ST/ONLY COMPT VAC/TOX [id number] CPT(R)] REFERRAL TO PEDIATRIC OPHTHALMOLOGY [OYY335 Custom] Follow-up Instructions Return in about 1 year (around 3/8/2019) for 2 week follow up . Referral Information Referral ID Referred By Referred To  
  
 [id number] Uli Gunnels [facility] [address] [place], [address] Visits Status Start Date End Date 1 New Request 3/8/18 3/8/19 If your referral has a status of pending review or denied, additional information will be sent to support the outcome of this decision. Patient Instructions Child's Well Visit, 24 Months: Care Instructions Your Care Instructions You can help your toddler through this exciting year by giving love and setting limits. Most children learn to use the toilet between ages 3 and 3. You can help your child with potty training. Keep reading to your child. It helps his or her brain grow and strengthens your bond. Your 3year-old's body, mind, and emotions are growing quickly. Your child may be able to put two (and maybe three) words together. Toddlers are full of energy, and they are curious. Your child may want to open every drawer, test how things work, and often test your patience. This happens because your child wants to be independent. But he or she still wants you to give guidance. Follow-up care is a key part of your child's treatment and safety. Be sure to make and go to all appointments, and call your doctor if your child is having problems. It's also a good idea to know your child's test results and keep a list of the medicines your child takes. How can you care for your child at home? Safety · Help prevent your child from choking by offering the right kinds of foods and watching out for choking hazards. · Watch your child at all times near the street or in a parking lot. Drivers may not be able to see small children. Know where your child is and check carefully before backing your car out of the driveway. · Watch your child at all times when he or she is near water, including pools, hot tubs, buckets, bathtubs, and toilets. · For every ride in a car, secure your child into a properly installed car seat that meets all current safety standards. For questions about car seats, call the [employer] at 6-483.589.7919. · Make sure your child cannot get burned. Keep hot pots, curling irons, irons, and coffee cups out of his or her reach. Put plastic plugs in all electrical sockets. Put in smoke detectors and check the batteries regularly. · Put locks or guards on all windows above the first floor. Watch your child at all times near play equipment and stairs. If your child is climbing out of his or her crib, change to a toddler bed. · Keep cleaning products and medicines in locked cabinets out of your child's reach. Keep the number for Poison Control (1-871.108.6540) in or near your phone. · Tell your doctor if your child spends a lot of time in a house built before 1978. The paint could have lead in it, which can be harmful. · Help your child brush his or her teeth every day. For children this age, use a tiny amount of toothpaste with fluoride (the size of a grain of rice). Give your child loving discipline · Use facial expressions and body language to show you are sad or glad about your child's behavior. Shake your head \"no,\" with a hendrickson look on your face, when your toddler does something you do not like. Reward good behavior with a smile and a positive comment. (\"I like how you play gently with your toys. \") · Redirect your child. If your child cannot play with a toy without throwing it, put the toy away and show your child another toy. · Do not expect a child of 2 to do things he or she cannot do. Your child can learn to sit quietly for a few minutes. But a child of 2 usually cannot sit still through a long dinner in a restaurant. · Let your child do things for himself or herself (as long as it is safe). Your child may take a long time to pull off a sweater. But a child who has some freedom to try things may be less likely to say \"no\" and fight you. · Try to ignore some behavior that does not harm your child or others, such as whining or temper tantrums. If you react to a child's anger, you give him or her attention for getting upset. Help your child learn to use the toilet · Get your child his or her own little potty, or a child-sized toilet seat that fits over a regular toilet. · Tell your child that the body makes \"pee\" and \"poop\" every day and that those things need to go into the toilet. Ask your child to \"help the poop get into the toilet. \" 
· Praise your child with hugs and kisses when he or she uses the potty. Support your child when he or she has an accident. (\"That is okay. Accidents happen. \") Immunizations Make sure that your child gets all the recommended childhood vaccines, which help keep your baby healthy and prevent the spread of disease. When should you call for help? Watch closely for changes in your child's health, and be sure to contact your doctor if: 
? · You are concerned that your child is not growing or developing normally. ? · You are worried about your child's behavior. ? · You need more information about how to care for your child, or you have questions or concerns. Where can you learn more? Go to http://pauline-janes.info/. Enter X644 in the search box to learn more about \"Child's Well Visit, 24 Months: Care Instructions. \" Current as of: May 12, 2017 Content Version: 11.4 © 5258-3360 [employer]. Care instructions adapted under license by [employer] (which disclaims liability or warranty for this information). If you have questions about a medical condition or this instruction, always ask your healthcare professional. [address] any warranty or liability for your use of this information. Vaccine Information Statement Influenza (Flu) Vaccine (Inactivated or Recombinant): What you need to know Many Vaccine Information Statements are available in [place] Islander and other languages. See www.immunize.org/vis Hojas de Información Sobre Vacunas están disponibles en Español y en muchos otros idiomas. Visite www.immunize.org/vis 1. Why get vaccinated? Influenza (flu) is a contagious disease that spreads around the [place] every year, usually between October and May. Flu is caused by influenza viruses, and is spread mainly by coughing, sneezing, and close contact. Anyone can get flu. Flu strikes suddenly and can last several days. Symptoms vary by age, but can include: 
 fever/chills  sore throat  muscle aches  fatigue  cough  headache  runny or stuffy nose Flu can also lead to pneumonia and blood infections, and cause diarrhea and seizures in children. If you have a medical condition, such as heart or lung disease, flu can make it worse. Flu is more dangerous for some people. Infants and young children, people 72years of age and older, pregnant women, and people with certain health conditions or a weakened immune system are at greatest risk. Each year thousands of people in the [facility] die from flu, and many more are hospitalized. Flu vaccine can: 
 keep you from getting flu, 
 make flu less severe if you do get it, and 
 keep you from spreading flu to your family and other people. 2. Inactivated and recombinant flu vaccines A dose of flu vaccine is recommended every flu season. Children 6 months through 6years of age may need two doses during the same flu season. Everyone else needs only one dose each flu season. Some inactivated flu vaccines contain a very small amount of a mercury-based preservative called thimerosal. Studies have not shown thimerosal in vaccines to be harmful, but flu vaccines that do not contain thimerosal are available. There is no live flu virus in flu shots. They cannot cause the flu. There are many flu viruses, and they are always changing. Each year a new flu vaccine is made to protect against three or four viruses that are likely to cause disease in the upcoming flu season. But even when the vaccine doesnt exactly match these viruses, it may still provide some protection Flu vaccine cannot prevent: 
 flu that is caused by a virus not covered by the vaccine, or 
 illnesses that look like flu but are not. It takes about 2 weeks for protection to develop after vaccination, and protection lasts through the flu season. 3. Some people should not get this vaccine Tell the person who is giving you the vaccine:  If you have any severe, life-threatening allergies. If you ever had a life-threatening allergic reaction after a dose of flu vaccine, or have a severe allergy to any part of this vaccine, you may be advised not to get vaccinated. Most, but not all, types of flu vaccine contain a small amount of egg protein.  If you ever had Guillain-Barré Syndrome (also called GBS). Some people with a history of GBS should not get this vaccine. This should be discussed with your doctor.  If you are not feeling well. It is usually okay to get flu vaccine when you have a mild illness, but you might be asked to come back when you feel better. 4. Risks of a vaccine reaction With any medicine, including vaccines, there is a chance of reactions. These are usually mild and go away on their own, but serious reactions are also possible. Most people who get a flu shot do not have any problems with it. Minor problems following a flu shot include:  
 soreness, redness, or swelling where the shot was given  hoarseness  sore, red or itchy eyes  cough  fever  aches  headache  itching  fatigue If these problems occur, they usually begin soon after the shot and last 1 or 2 days. More serious problems following a flu shot can include the following:  There may be a small increased risk of Guillain-Barré Syndrome (GBS) after inactivated flu vaccine. This risk has been estimated at 1 or 2 additional cases per million people vaccinated. This is much lower than the risk of severe complications from flu, which can be prevented by flu vaccine.  Young children who get the flu shot along with pneumococcal vaccine (PCV13) and/or DTaP vaccine at the same time might be slightly more likely to have a seizure caused by fever. Ask your doctor for more information. Tell your doctor if a child who is getting flu vaccine has ever had a seizure. Problems that could happen after any injected vaccine:  People sometimes faint after a medical procedure, including vaccination. Sitting or lying down for about 15 minutes can help prevent fainting, and injuries caused by a fall. Tell your doctor if you feel dizzy, or have vision changes or ringing in the ears.  Some people get severe pain in the shoulder and have difficulty moving the arm where a shot was given. This happens very rarely.  Any medication can cause a severe allergic reaction. Such reactions from a vaccine are very rare, estimated at about 1 in a million doses, and would happen within a few minutes to a few hours after the vaccination. As with any medicine, there is a very remote chance of a vaccine causing a serious injury or death. The safety of vaccines is always being monitored. For more information, visit: www.cdc.gov/vaccinesafety/ 
 
 
The [facility] Vaccine Injury Compensation Program (VICP) is a federal program that was created to compensate people who may have been injured by certain vaccines. Persons who believe they may have been injured by a vaccine can learn about the program and about filing a claim by calling 0-214.986.7845 or visiting the [address] [employer] [employer] website at www.[facility].gov/vaccinecompensation. There is a time limit to file a claim for compensation. 7. How can I learn more?  Ask your healthcare provider. He or she can give you the vaccine package insert or suggest other sources of information.  Call your local or state health department.  Contact the Centers for Disease Control and Prevention (CDC): 
- Call 8-574.979.9509 (1-800-CDC-INFO) or 
- Visit CDCs website at www.cdc.gov/flu Vaccine Information Statement Inactivated Influenza Vaccine 8/7/2015 
42 AYDIEL Berger [id number] Department of Health and [employer] Centers for Disease Control and Prevention Office Use Only Vaccine Information Statement Hepatitis A Vaccine: What You Need to Know Many Vaccine Information Statements are available in [place] and other languages. See www.immunize.org/vis. Hojas de información Sobre Vacunas están disponibles en español y en muchos otros idiomas. Visite www.immunize.org/vis 1. Why get vaccinated? Hepatitis A is a serious liver disease. It is caused by the hepatitis A virus (HAV). HAV is spread from person to person through contact with the feces (stool) of people who are infected, which can easily happen if someone does not wash his or her hands properly. You can also get hepatitis A from food, water, or objects contaminated with HAV. Symptoms of hepatitis A can include: 
 fever, fatigue, loss of appetite, nausea, vomiting, and/or joint pain  severe stomach pains and diarrhea (mainly in children), or 
 jaundice (yellow skin or eyes, dark urine, roopa-colored bowel movements). These symptoms usually appear 2 to 6 weeks after exposure and usually last less than 2 months, although some people can be ill for as long as 6 months. If you have hepatitis A you may be too ill to work. Children often do not have symptoms, but most adults do. You can spread HAV without having symptoms. Hepatitis A can cause liver failure and death, although this is rare and occurs more commonly in persons 48years of age or older and persons with other liver diseases, such as hepatitis B or C. Hepatitis A vaccine can prevent hepatitis A. Hepatitis A vaccines were recommended in the [facility] beginning in 1996. Since then, the number of cases reported each year in the [place] has dropped from around 31,000 cases to fewer than 1,500 cases. 2. Hepatitis A vaccine Hepatitis A vaccine is an inactivated (killed) vaccine.  You will need 2 doses for long-lasting protection. These doses should be given at least 6 months apart. Children are routinely vaccinated between their first and second birthdays (15 through 22 months of age). Older children and adolescents can get the vaccine after 23 months. Adults who have not been vaccinated previously and want to be protected against hepatitis A can also get the vaccine. You should get hepatitis A vaccine if you: 
 are traveling to countries where hepatitis A is common, 
 are a man who has sex with other men, 
 use illegal drugs, 
 have a chronic liver disease such as hepatitis B or hepatitis C, 
 are being treated with clotting-factor concentrates,  
 work with hepatitis A-infected animals or in a hepatitis A research laboratory, or 
 expect to have close personal contact with an international adoptee from a country where hepatitis A is common Ask your healthcare provider if you want more information about any of these groups. There are no known risks to getting hepatitis A vaccine at the same time as other vaccines. 3. Some people should not get this vaccine Tell the person who is giving you the vaccine:  If you have any severe, life-threatening allergies. If you ever had a life-threatening allergic reaction after a dose of hepatitis A vaccine, or have a severe allergy to any part of this vaccine, you may be advised not to get vaccinated. Ask your health care provider if you want information about vaccine components.  If you are not feeling well. If you have a mild illness, such as a cold, you can probably get the vaccine today. If you are moderately or severely ill, you should probably wait until you recover. Your doctor can advise you. 4. Risks of a vaccine reaction With any medicine, including vaccines, there is a chance of side effects. These are usually mild and go away on their own, but serious reactions are also possible. Most people who get hepatitis A vaccine do not have any problems with it. Minor problems following hepatitis A vaccine include: 
 soreness or redness where the shot was given  low-grade fever  headache  tiredness If these problems occur, they usually begin soon after the shot and last 1 or 2 days. Your doctor can tell you more about these reactions. Other problems that could happen after this vaccine:  People sometimes faint after a medical procedure, including vaccination. Sitting or lying down for about 15 minutes can help prevent fainting, and injuries caused by a fall. Tell your provider if you feel dizzy, or have vision changes or ringing in the ears.  Some people get shoulder pain that can be more severe and longer lasting than the more routine soreness that can follow injections. This happens very rarely.  Any medication can cause a severe allergic reaction. Such reactions from a vaccine are very rare, estimated at about 1 in a million doses, and would happen within a few minutes to a few hours after the vaccination. As with any medicine, there is a very remote chance of a vaccine causing a serious injury or death. The safety of vaccines is always being monitored. For more information, visit: www.cdc.gov/vaccinesafety/ 
 
5. What if there is a serious problem? What should I look for?  Look for anything that concerns you, such as signs of a severe allergic reaction, very high fever, or unusual behavior. Signs of a severe allergic reaction can include hives, swelling of the face and throat, difficulty breathing, a fast heartbeat, dizziness, and weakness. These would usually start a few minutes to a few hours after the vaccination. What should I do?  If you think it is a severe allergic reaction or other emergency that cant wait, call 9-1-1 and get to the nearest hospital. Otherwise, call your clinic. Afterward, the reaction should be reported to the Vaccine Adverse Event Reporting System (VAERS). Your doctor should file this report, or you can do it yourself through the VAERS web site at www.vaers. [facility].gov, or by calling 0-544.996.3876. VAERS does not give medical advice. 6. The National Vaccine Injury Compensation Program 
 
The [facility] Vaccine Injury Compensation Program (VICP) is a federal program that was created to compensate people who may have been injured by certain vaccines. Persons who believe they may have been injured by a vaccine can learn about the program and about filing a claim by calling 1-311.161.3154 or visiting the [employer] website at www.[facility].gov/vaccinecompensation. There is a time limit to file a claim for compensation. 7. How can I learn more?  Ask your healthcare provider. He or she can give you the vaccine package insert or suggest other sources of information.  Call your local or state health department.  Contact the Centers for Disease Control and Prevention (CDC): 
- Call 2-855.833.5675 (1-800-CDC-INFO) or 
- Visit CDCs website at www.cdc.gov/vaccines Vaccine Information Statement Hepatitis A Vaccine 2016 
42 YADIEL Chen Pronto [id number] [place]. [place]. Department of Health and [employer] Centers for Disease Control and Prevention Office Use Only Learning About Strabismus in Children What is strabismus? Strabismus means that both eyes don't look at the same thing at the same time. One eye may look straight ahead while the other eye looks in another direction. It is sometimes called \"cross-eye\" or \"walleye. \" It occurs when the eye muscles don't work together to move both eyes in the same direction at the same time. This sends two different images to the brain. In a young child with strabismus, the brain chooses to receive the images from only one eye.  
Childhood strabismus often has no known cause, though it tends to run in families. Sometimes it develops when the eyes try to make up for other vision problems. Other things that can put a child at risk for strabismus include an illness that affects the muscles and nerves, premature birth, Down syndrome, a head injury, and other problems. Treatment should begin as soon as possible. The younger your child is when treatment starts, the better the chances are of correcting the problem. What happens when your child has strabismus? When one eye isn't used enough, the visual system in the brain may not develop as it should. The brain may ignore the images from the weaker eye and use only the images from the stronger eye. This can lead to poor vision in the weaker eye. What are the symptoms? The most common signs are: · Eyes that don't look in the same direction at the same time. · Eyes that don't move together. · Squinting or closing one eye in bright sunlight. · Tilting or turning the head to look at an object. · Bumping into things. How is strabismus treated? The most common treatments are: · Glasses. They can sometimes correct mild strabismus. · A temporary eye patch that your child may wear over the stronger eye. Your doctor may suggest this if your child sees better out of one eye than the other. This can make the weak eye stronger. It also may help align the eyes. · Surgery on the eye muscles. This is often the only way to better align the eyes. It may take more than one surgery. Your child may still need to wear glasses. Other treatments may include medicines and eye exercises. Follow-up care is a key part of your child's treatment and safety. Be sure to make and go to all appointments, and call your doctor if your child is having problems. It's also a good idea to know your child's test results and keep a list of the medicines your child takes. Where can you learn more? Go to http://pauline-janes.info/. Enter B940 in the search box to learn more about \"Learning About Strabismus in Children. \" Current as of: March 3, 2017 Content Version: 11.4 © 6440-1498 [employer]. Care instructions adapted under license by [employer] (which disclaims liability or warranty for this information). If you have questions about a medical condition or this instruction, always ask your healthcare professional. [address] any warranty or liability for your use of this information. Introducing \[facility]\"" & HEALTH SERVICES! Dear Parent or Guardian, Thank you for requesting a [employer] account for your child. With [employer], you can view your childs hospital or ER discharge instructions, current allergies, immunizations and much more. In order to access your childs information, we require a signed consent on file. Please see the [employer] department or call [phone] for instructions on completing a Outspark Proxy request.   
Additional Information If you have questions, please visit the Frequently Asked Questions section of the Out[employer] website at [email or web address] Remember, Out[employer] is NOT to be used for urgent needs. For medical emergencies, dial 911. Now available from your iPhone and Android! Please provide this summary of care documentation to your next provider. Your primary care clinician is listed as Rachael Bai. If you have any questions after today's visit, please call [phone].

## 2020-08-17 ENCOUNTER — OFFICE VISIT (OUTPATIENT)
Dept: RHEUMATOLOGY | Facility: CLINIC | Age: 70
End: 2020-08-17
Payer: MEDICARE

## 2020-08-17 VITALS
HEART RATE: 68 BPM | WEIGHT: 131 LBS | RESPIRATION RATE: 18 BRPM | DIASTOLIC BLOOD PRESSURE: 50 MMHG | SYSTOLIC BLOOD PRESSURE: 102 MMHG | BODY MASS INDEX: 25.72 KG/M2 | HEIGHT: 60 IN | TEMPERATURE: 98 F

## 2020-08-17 DIAGNOSIS — M06.9 RHEUMATOID ARTHRITIS INVOLVING MULTIPLE SITES, UNSPECIFIED RHEUMATOID FACTOR PRESENCE: Primary | ICD-10-CM

## 2020-08-17 DIAGNOSIS — D46.9 MYELODYSPLASTIC SYNDROME (HCC): ICD-10-CM

## 2020-08-17 DIAGNOSIS — L30.9 DERMATITIS: ICD-10-CM

## 2020-08-17 DIAGNOSIS — K12.1 MOUTH ULCERS: ICD-10-CM

## 2020-08-17 DIAGNOSIS — M06.09 SERONEGATIVE RHEUMATOID ARTHRITIS OF MULTIPLE SITES (HCC): ICD-10-CM

## 2020-08-17 PROCEDURE — 99214 OFFICE O/P EST MOD 30 MIN: CPT | Performed by: INTERNAL MEDICINE

## 2020-08-17 RX ORDER — PREDNISONE 10 MG/1
10 TABLET ORAL DAILY
Qty: 5 TABLET | Refills: 0 | Status: SHIPPED | OUTPATIENT
Start: 2020-08-17 | End: 2021-03-17

## 2020-08-17 RX ORDER — HYDROXYZINE PAMOATE 25 MG/1
25 CAPSULE ORAL 4 TIMES DAILY PRN
Qty: 60 CAPSULE | Refills: 1 | Status: SHIPPED | OUTPATIENT
Start: 2020-08-17

## 2020-08-17 NOTE — PROGRESS NOTES
[facility]  Dr. Viola Lopez Progress Note     Subjective:   Nicole Dalton is a(n) 69year old female.    Current complaints: Patient presents with:  Rheumatoid Arthritis: 4 month f/u,last week c/o mouth sores that bleed also  c/o itching,  Joint pain ok, had to try to help with the dermatitis and  the mouth sores. Continue to follow coronavirus guidelines. Follow-up visit in 3 months.         Maddie Kim MD 7/37/2138 11:58 AM

## 2020-08-17 NOTE — PATIENT INSTRUCTIONS
Due to mouth sores - lower methotrexate from 10 tablets a week just 5 tablets a week. Denies the folic acid 2 mg a day which means 2 tablets a day.   For itchy of the skin itching,  use an over-the-counter moisturizing lotion called Aquaphor apply daily an

## 2020-11-17 ENCOUNTER — OFFICE VISIT (OUTPATIENT)
Dept: RHEUMATOLOGY | Facility: CLINIC | Age: 70
End: 2020-11-17
Payer: MEDICARE

## 2020-11-17 VITALS
TEMPERATURE: 97 F | BODY MASS INDEX: 27.29 KG/M2 | RESPIRATION RATE: 18 BRPM | WEIGHT: 139 LBS | DIASTOLIC BLOOD PRESSURE: 60 MMHG | HEART RATE: 68 BPM | HEIGHT: 60 IN | SYSTOLIC BLOOD PRESSURE: 100 MMHG

## 2020-11-17 DIAGNOSIS — D46.9 MYELODYSPLASTIC SYNDROME (HCC): ICD-10-CM

## 2020-11-17 DIAGNOSIS — M17.0 PRIMARY OSTEOARTHRITIS OF BOTH KNEES: ICD-10-CM

## 2020-11-17 DIAGNOSIS — M06.09 RHEUMATOID ARTHRITIS OF MULTIPLE SITES WITHOUT RHEUMATOID FACTOR (HCC): ICD-10-CM

## 2020-11-17 DIAGNOSIS — M06.09 SERONEGATIVE RHEUMATOID ARTHRITIS OF MULTIPLE SITES (HCC): ICD-10-CM

## 2020-11-17 PROCEDURE — 99213 OFFICE O/P EST LOW 20 MIN: CPT | Performed by: INTERNAL MEDICINE

## 2020-11-17 RX ORDER — FOLIC ACID 1 MG/1
1 TABLET ORAL DAILY
Qty: 90 TABLET | Refills: 2 | Status: SHIPPED | OUTPATIENT
Start: 2020-11-17 | End: 2021-03-17

## 2020-11-17 RX ORDER — TRAMADOL HYDROCHLORIDE 50 MG/1
50 TABLET ORAL EVERY 12 HOURS PRN
Qty: 60 TABLET | Refills: 5 | Status: SHIPPED | OUTPATIENT
Start: 2020-11-17 | End: 2021-03-17

## 2020-11-17 RX ORDER — HYDROXYCHLOROQUINE SULFATE 200 MG/1
200 TABLET, FILM COATED ORAL DAILY
Qty: 90 TABLET | Refills: 3 | Status: SHIPPED | OUTPATIENT
Start: 2020-11-17 | End: 2021-03-17

## 2020-11-17 RX ORDER — SULFASALAZINE 500 MG/1
1000 TABLET ORAL 2 TIMES DAILY
Qty: 120 TABLET | Refills: 3 | Status: SHIPPED | OUTPATIENT
Start: 2020-11-17 | End: 2021-03-17

## 2020-11-17 RX ORDER — GABAPENTIN 300 MG/1
300 CAPSULE ORAL 2 TIMES DAILY
Qty: 180 CAPSULE | Refills: 1 | Status: SHIPPED | OUTPATIENT
Start: 2020-11-17

## 2020-11-17 NOTE — PROGRESS NOTES
[facility]  Dr. Sinai Cornejo Progress Note     Subjective:   Rosemarie Stone is a(n) 69year old female. Current complaints: Patient presents with: Follow - Up: LOV 8/17/2020 RA; Pt doing well, denies joint pain or recent flares.  Taking methotrexate and swapna

## 2020-11-17 NOTE — PATIENT INSTRUCTIONS
Methotrexate 10 tablets per week  =  25 mg per week'  Sulfasalazine 500 mg , 2 tablets twice a day. Plaquenil 200 mg  a day. Tramadol 50 mg for pain as needed. Gabapentin 300 mg twice a day. 'Folic acid 1 mg per day. Triamcinolone cream as needed.   Retu

## 2021-02-02 DIAGNOSIS — Z23 NEED FOR VACCINATION: ICD-10-CM

## 2021-03-17 ENCOUNTER — OFFICE VISIT (OUTPATIENT)
Dept: RHEUMATOLOGY | Facility: CLINIC | Age: 71
End: 2021-03-17
Payer: MEDICARE

## 2021-03-17 VITALS
DIASTOLIC BLOOD PRESSURE: 60 MMHG | BODY MASS INDEX: 27.48 KG/M2 | WEIGHT: 140 LBS | TEMPERATURE: 98 F | HEIGHT: 60 IN | HEART RATE: 77 BPM | SYSTOLIC BLOOD PRESSURE: 100 MMHG

## 2021-03-17 DIAGNOSIS — M06.09 SERONEGATIVE RHEUMATOID ARTHRITIS OF MULTIPLE SITES (HCC): ICD-10-CM

## 2021-03-17 DIAGNOSIS — M17.0 PRIMARY OSTEOARTHRITIS OF BOTH KNEES: ICD-10-CM

## 2021-03-17 DIAGNOSIS — D46.9 MYELODYSPLASTIC SYNDROME (HCC): ICD-10-CM

## 2021-03-17 DIAGNOSIS — M06.09 RHEUMATOID ARTHRITIS OF MULTIPLE SITES WITHOUT RHEUMATOID FACTOR (HCC): ICD-10-CM

## 2021-03-17 PROCEDURE — 99214 OFFICE O/P EST MOD 30 MIN: CPT | Performed by: INTERNAL MEDICINE

## 2021-03-17 RX ORDER — SULFASALAZINE 500 MG/1
1000 TABLET ORAL 2 TIMES DAILY
Qty: 360 TABLET | Refills: 1 | Status: SHIPPED | OUTPATIENT
Start: 2021-03-17 | End: 2021-07-27

## 2021-03-17 RX ORDER — FOLIC ACID 1 MG/1
1 TABLET ORAL DAILY
Qty: 90 TABLET | Refills: 3 | Status: SHIPPED | OUTPATIENT
Start: 2021-03-17 | End: 2021-07-27

## 2021-03-17 RX ORDER — HYDROXYCHLOROQUINE SULFATE 200 MG/1
200 TABLET, FILM COATED ORAL DAILY
Qty: 90 TABLET | Refills: 3 | Status: SHIPPED | OUTPATIENT
Start: 2021-03-17 | End: 2021-06-15

## 2021-03-17 RX ORDER — TRAMADOL HYDROCHLORIDE 50 MG/1
50 TABLET ORAL EVERY 12 HOURS PRN
Qty: 60 TABLET | Refills: 5 | Status: SHIPPED | OUTPATIENT
Start: 2021-03-17 | End: 2021-04-16

## 2021-03-17 RX ORDER — PREDNISONE 10 MG/1
10 TABLET ORAL DAILY
Qty: 90 TABLET | Refills: 1 | Status: SHIPPED | OUTPATIENT
Start: 2021-03-17 | End: 2021-06-15

## 2021-03-17 NOTE — PATIENT INSTRUCTIONS
For Rheumatoid Arthritis -  Methotrexate lower to 8 tablets per week, 4 one day, 4 the next. If the RA gets worse increase back to 10 tablets per week. Sulfasazine 500 mg, 2 tablets twice a day,  Plaquenil 200 mg once a day. Prednisone 10 mg once a day.

## 2021-03-17 NOTE — PROGRESS NOTES
[facility]  Dr. Gaye Luna Progress Note     Subjective:   José Manuel Calhoun is a(n) 69year old female. Current complaints: Patient presents with:  Rheumatoid Arthritis: LOV 11/17/2020, pain has been better. Night time is where she will have flare ups.  Liborio needed 50 mg dose. Triamcinolone cream use on itchy rash. Vitamin D is low, normal > 30, your level low at 14. Take Vitamin D 1,0000 units daily. Labs also show mild anemia. Return to office July 2021.         Rosas Lebron MD 2/91/6444 1:52 PM

## 2021-07-16 ENCOUNTER — TELEPHONE (OUTPATIENT)
Dept: RHEUMATOLOGY | Facility: CLINIC | Age: 71
End: 2021-07-16

## 2021-07-16 NOTE — TELEPHONE ENCOUNTER
Test results from [place] [address] system 7/15/2021 CBC Mrs. Aguero–white count 1.8. Hemoglobin 9.9 hematocrit 30.3  platelet count 20,239.

## 2021-07-27 ENCOUNTER — OFFICE VISIT (OUTPATIENT)
Dept: RHEUMATOLOGY | Facility: CLINIC | Age: 71
End: 2021-07-27
Payer: MEDICARE

## 2021-07-27 VITALS
DIASTOLIC BLOOD PRESSURE: 60 MMHG | HEIGHT: 60 IN | SYSTOLIC BLOOD PRESSURE: 98 MMHG | OXYGEN SATURATION: 98 % | BODY MASS INDEX: 26.9 KG/M2 | WEIGHT: 137 LBS | HEART RATE: 84 BPM

## 2021-07-27 DIAGNOSIS — M06.09 SERONEGATIVE RHEUMATOID ARTHRITIS OF MULTIPLE SITES (HCC): ICD-10-CM

## 2021-07-27 DIAGNOSIS — S66.811A RUPTURED EXTENSOR TENDON OF HAND OR WRIST, RIGHT, INITIAL ENCOUNTER: ICD-10-CM

## 2021-07-27 DIAGNOSIS — M06.09 RHEUMATOID ARTHRITIS OF MULTIPLE SITES WITHOUT RHEUMATOID FACTOR (HCC): Primary | ICD-10-CM

## 2021-07-27 DIAGNOSIS — M17.0 PRIMARY OSTEOARTHRITIS OF BOTH KNEES: ICD-10-CM

## 2021-07-27 DIAGNOSIS — D46.9 MYELODYSPLASTIC SYNDROME (HCC): ICD-10-CM

## 2021-07-27 DIAGNOSIS — D61.818 PANCYTOPENIA (HCC): ICD-10-CM

## 2021-07-27 PROCEDURE — 99214 OFFICE O/P EST MOD 30 MIN: CPT | Performed by: INTERNAL MEDICINE

## 2021-07-27 RX ORDER — METHOTREXATE 2.5 MG/1
15 TABLET ORAL WEEKLY
Qty: 30 TABLET | Refills: 5 | Status: SHIPPED | OUTPATIENT
Start: 2021-07-27 | End: 2021-08-26

## 2021-07-27 RX ORDER — TRAMADOL HYDROCHLORIDE 50 MG/1
50 TABLET ORAL EVERY 8 HOURS PRN
Qty: 90 TABLET | Refills: 0 | Status: SHIPPED | OUTPATIENT
Start: 2021-07-27

## 2021-07-27 RX ORDER — AMLODIPINE BESYLATE 2.5 MG/1
2.5 TABLET ORAL DAILY
COMMUNITY
Start: 2021-07-15

## 2021-07-27 RX ORDER — PREDNISONE 10 MG/1
10 TABLET ORAL DAILY
Qty: 90 TABLET | Refills: 3 | Status: SHIPPED | OUTPATIENT
Start: 2021-07-27 | End: 2021-07-27 | Stop reason: DRUGHIGH

## 2021-07-27 RX ORDER — FOLIC ACID 1 MG/1
1 TABLET ORAL DAILY
Qty: 90 TABLET | Refills: 3 | Status: SHIPPED | OUTPATIENT
Start: 2021-07-27

## 2021-07-27 RX ORDER — PREDNISONE 1 MG/1
5 TABLET ORAL DAILY
Qty: 90 TABLET | Refills: 1 | Status: SHIPPED | OUTPATIENT
Start: 2021-07-27

## 2021-07-27 RX ORDER — SULFASALAZINE 500 MG/1
1000 TABLET ORAL 2 TIMES DAILY
Qty: 360 TABLET | Refills: 1 | Status: SHIPPED | OUTPATIENT
Start: 2021-07-27

## 2021-07-27 NOTE — PROGRESS NOTES
[facility]  Dr. Lafleur General Progress Note     Subjective:   Edward Brown is a(n) 66year old female. Current complaints: Patient presents with:   Follow - Up: LOV 3-17-21; feels okay; Bilat wrist pain; labs in [place] 7-16-21 reviewed by Dr Nolasco previously   Has [place] [id number]. A wrist splint can be used, it will relieve pain in the wrist, but it will not make the tendons that are ruptured reunite, the ruptured tendons need surgery.   Rheumatoid arthritis, take methotrexate 6 tablets/week, 3 tablets 1 day 3 t

## 2021-07-27 NOTE — PATIENT INSTRUCTIONS
Fourth and fifth fingers in both hands have ruptured extensor tendons. The only way to fix the ruptured extensor tendons would be with surgery.   You would need to see a hand surgeon, orthopedic hand surgeon at your local hospital.  [facility] bone and joint

## 2021-10-11 DIAGNOSIS — M06.09 SERONEGATIVE RHEUMATOID ARTHRITIS OF MULTIPLE SITES (HCC): ICD-10-CM

## 2021-10-11 DIAGNOSIS — D46.9 MYELODYSPLASTIC SYNDROME (HCC): ICD-10-CM

## 2021-10-11 DIAGNOSIS — M06.09 RHEUMATOID ARTHRITIS OF MULTIPLE SITES WITHOUT RHEUMATOID FACTOR (HCC): ICD-10-CM

## 2021-10-11 DIAGNOSIS — M17.0 PRIMARY OSTEOARTHRITIS OF BOTH KNEES: ICD-10-CM

## 2021-10-11 RX ORDER — HYDROXYCHLOROQUINE SULFATE 200 MG/1
TABLET, FILM COATED ORAL
Qty: 90 TABLET | Refills: 3 | Status: SHIPPED | OUTPATIENT
Start: 2021-10-11

## 2021-10-11 NOTE — TELEPHONE ENCOUNTER
Future Appointments   Date Time Provider Radha Ragsdale   24/63/6427 91:24 AM Danielle Boucher MD [facility]

## 2021-12-27 RX ORDER — PREDNISONE 5 MG/1
TABLET ORAL
Qty: 90 TABLET | Refills: 1 | OUTPATIENT
Start: 2021-12-27

## 2022-03-14 RX ORDER — FOLIC ACID 1 MG/1
TABLET ORAL
Qty: 90 TABLET | Refills: 3 | Status: SHIPPED | OUTPATIENT
Start: 2022-03-14

## 2022-04-29 RX ORDER — FOLIC ACID 1 MG/1
TABLET ORAL
Qty: 90 TABLET | Refills: 3 | Status: SHIPPED | OUTPATIENT
Start: 2022-04-29

## 2022-04-29 NOTE — TELEPHONE ENCOUNTER
Future Appointments   Date Time Provider Radha Melyssa   2/3/0587 23:07 AM [facility]     lov 7/27/21  rto [place]

## 2022-08-23 ENCOUNTER — OFFICE VISIT (OUTPATIENT)
Dept: RHEUMATOLOGY | Facility: CLINIC | Age: 72
End: 2022-08-23
Payer: MEDICARE

## 2022-08-23 VITALS
WEIGHT: 144 LBS | HEART RATE: 74 BPM | SYSTOLIC BLOOD PRESSURE: 126 MMHG | BODY MASS INDEX: 26.5 KG/M2 | TEMPERATURE: 97 F | HEIGHT: 62 IN | OXYGEN SATURATION: 96 % | DIASTOLIC BLOOD PRESSURE: 82 MMHG

## 2022-08-23 DIAGNOSIS — M06.09 RHEUMATOID ARTHRITIS OF MULTIPLE SITES WITHOUT RHEUMATOID FACTOR (HCC): ICD-10-CM

## 2022-08-23 DIAGNOSIS — M17.0 PRIMARY OSTEOARTHRITIS OF BOTH KNEES: ICD-10-CM

## 2022-08-23 DIAGNOSIS — M06.09 SERONEGATIVE RHEUMATOID ARTHRITIS OF MULTIPLE SITES (HCC): ICD-10-CM

## 2022-08-23 DIAGNOSIS — D46.9 MYELODYSPLASTIC SYNDROME (HCC): ICD-10-CM

## 2022-08-23 DIAGNOSIS — S66.811S: ICD-10-CM

## 2022-08-23 DIAGNOSIS — S66.812D RUPTURE OF EXTENSOR TENDONS OF LEFT HAND AND WRIST, SUBSEQUENT ENCOUNTER: Primary | ICD-10-CM

## 2022-08-23 PROBLEM — S66.812A: Status: ACTIVE | Noted: 2019-12-12

## 2022-08-23 PROCEDURE — 99214 OFFICE O/P EST MOD 30 MIN: CPT | Performed by: INTERNAL MEDICINE

## 2022-08-23 RX ORDER — GABAPENTIN 300 MG/1
300 CAPSULE ORAL 2 TIMES DAILY
Qty: 180 CAPSULE | Refills: 1 | Status: CANCELLED
Start: 2022-08-23

## 2022-08-23 RX ORDER — PREDNISONE 1 MG/1
5 TABLET ORAL DAILY
Qty: 90 TABLET | Refills: 1 | Status: CANCELLED
Start: 2022-08-23

## 2022-08-23 RX ORDER — TRAMADOL HYDROCHLORIDE 50 MG/1
50 TABLET ORAL EVERY 8 HOURS PRN
Qty: 90 TABLET | Refills: 0 | Status: CANCELLED
Start: 2022-08-23

## 2022-08-23 RX ORDER — HYDROCODONE BITARTRATE AND ACETAMINOPHEN 10; 325 MG/1; MG/1
1 TABLET ORAL EVERY 6 HOURS PRN
Qty: 100 TABLET | Refills: 0 | Status: SHIPPED | OUTPATIENT
Start: 2022-08-23

## 2022-08-23 RX ORDER — LEVOCETIRIZINE DIHYDROCHLORIDE 5 MG/1
5 TABLET, FILM COATED ORAL EVERY EVENING
Qty: 30 TABLET | Refills: 2 | Status: SHIPPED | OUTPATIENT
Start: 2022-08-23

## 2022-08-23 RX ORDER — HYDROXYCHLOROQUINE SULFATE 200 MG/1
200 TABLET, FILM COATED ORAL DAILY
Qty: 90 TABLET | Refills: 3 | Status: CANCELLED
Start: 2022-08-23

## 2022-08-23 RX ORDER — SULFASALAZINE 500 MG/1
1000 TABLET ORAL 2 TIMES DAILY
Qty: 360 TABLET | Refills: 1 | Status: CANCELLED
Start: 2022-08-23

## 2022-08-23 RX ORDER — TRIAMCINOLONE ACETONIDE 1 MG/G
CREAM TOPICAL 2 TIMES DAILY PRN
Qty: 60 G | Refills: 3 | Status: SHIPPED | OUTPATIENT
Start: 2022-08-23

## 2022-08-23 RX ORDER — GABAPENTIN 300 MG/1
300 CAPSULE ORAL 2 TIMES DAILY
Qty: 180 CAPSULE | Refills: 1 | Status: SHIPPED | OUTPATIENT
Start: 2022-08-23

## 2022-08-23 RX ORDER — PREDNISONE 1 MG/1
5 TABLET ORAL DAILY
Qty: 90 TABLET | Refills: 1 | Status: SHIPPED | OUTPATIENT
Start: 2022-08-23

## 2022-08-23 NOTE — PATIENT INSTRUCTIONS
Since the tramadol medicine is not working, stop the tramadol. For mild pain use extra strength Tylenol 2 tablets twice a day. For more severe pain taken Norco 5 mg once or twice a day. Also use ice on your left foot for 5 to 10 minutes to see if that will bring down the swelling and pain. Continue your methotrexate 6 tablets a week for rheumatoid arthritis. Folic acid is 1 mg a day to prevent side effects from the methotrexate. Plaquenil 200 mg twice a day. Neurontin 300 mg twice a day for pain control. Prednisone is 5 mg/day. Sulfasalazine is 500 mg, 2 tablets twice a day. Labs done in June at [facility] do show low white count of 3.0 and low platelet count of 542,029 this is due to effects of the arthritis on your blood. Your kidney function and liver function were stable. Return to office for recheck 4 months.

## 2022-08-30 ENCOUNTER — TELEPHONE (OUTPATIENT)
Dept: RHEUMATOLOGY | Facility: CLINIC | Age: 72
End: 2022-08-30

## 2022-12-07 ENCOUNTER — OFFICE VISIT (OUTPATIENT)
Dept: RHEUMATOLOGY | Facility: CLINIC | Age: 72
End: 2022-12-07
Payer: MEDICARE

## 2022-12-07 VITALS
OXYGEN SATURATION: 97 % | RESPIRATION RATE: 16 BRPM | SYSTOLIC BLOOD PRESSURE: 140 MMHG | WEIGHT: 149 LBS | HEIGHT: 62 IN | HEART RATE: 82 BPM | BODY MASS INDEX: 27.42 KG/M2 | DIASTOLIC BLOOD PRESSURE: 80 MMHG

## 2022-12-07 DIAGNOSIS — M06.09 RHEUMATOID ARTHRITIS OF MULTIPLE SITES WITHOUT RHEUMATOID FACTOR (HCC): ICD-10-CM

## 2022-12-07 DIAGNOSIS — D46.9 MYELODYSPLASTIC SYNDROME (HCC): ICD-10-CM

## 2022-12-07 DIAGNOSIS — M06.09 SERONEGATIVE RHEUMATOID ARTHRITIS OF MULTIPLE SITES (HCC): ICD-10-CM

## 2022-12-07 DIAGNOSIS — M17.0 PRIMARY OSTEOARTHRITIS OF BOTH KNEES: ICD-10-CM

## 2022-12-07 PROCEDURE — 99214 OFFICE O/P EST MOD 30 MIN: CPT | Performed by: INTERNAL MEDICINE

## 2022-12-07 RX ORDER — METHOTREXATE 2.5 MG/1
20 TABLET ORAL WEEKLY
Qty: 96 TABLET | Refills: 1 | Status: SHIPPED | OUTPATIENT
Start: 2022-12-07 | End: 2023-01-04

## 2022-12-07 RX ORDER — HYDROXYCHLOROQUINE SULFATE 200 MG/1
200 TABLET, FILM COATED ORAL 2 TIMES DAILY
Qty: 180 TABLET | Refills: 3 | Status: SHIPPED | OUTPATIENT
Start: 2022-12-07

## 2022-12-07 RX ORDER — LEVOCETIRIZINE DIHYDROCHLORIDE 5 MG/1
5 TABLET, FILM COATED ORAL EVERY EVENING
Qty: 30 TABLET | Refills: 2 | Status: SHIPPED | OUTPATIENT
Start: 2022-12-07

## 2022-12-07 RX ORDER — PREDNISONE 1 MG/1
10 TABLET ORAL DAILY
Qty: 180 TABLET | Refills: 1 | Status: SHIPPED | OUTPATIENT
Start: 2022-12-07

## 2022-12-07 RX ORDER — TRAMADOL HYDROCHLORIDE 50 MG/1
TABLET ORAL 2 TIMES DAILY PRN
Qty: 60 TABLET | Refills: 1 | Status: SHIPPED | OUTPATIENT
Start: 2022-12-07

## 2022-12-07 RX ORDER — FOLIC ACID 1 MG/1
1 TABLET ORAL DAILY
Qty: 90 TABLET | Refills: 3 | Status: SHIPPED | OUTPATIENT
Start: 2022-12-07

## 2022-12-07 RX ORDER — GABAPENTIN 300 MG/1
300 CAPSULE ORAL 2 TIMES DAILY
Qty: 180 CAPSULE | Refills: 1 | Status: SHIPPED | OUTPATIENT
Start: 2022-12-07

## 2022-12-07 RX ORDER — SULFASALAZINE 500 MG/1
1000 TABLET ORAL 2 TIMES DAILY
Qty: 360 TABLET | Refills: 1 | Status: SHIPPED | OUTPATIENT
Start: 2022-12-07

## 2022-12-07 NOTE — PATIENT INSTRUCTIONS
Increase Methotrexate to 8 tablets per week 4 on Saturday, 4 on Sunday. Increase Prednisone to 10 mg per day, 2  -5 mg tablets in the morning. Folic acid 1 mg per day. Plaquenil 200 mg twice a day. .  Sulfasalazine 500 mg   - 2 tablets twice a day. Tramadol for pain 50 mg - 1-2 twice a day. Next visit in 2 months. Heating pad to neck for 10 minutes, then apply Jorge Polanco cream.  However if not getting better will need injectable medication called Humira.

## 2023-02-07 ENCOUNTER — OFFICE VISIT (OUTPATIENT)
Dept: RHEUMATOLOGY | Facility: CLINIC | Age: 73
End: 2023-02-07
Payer: MEDICARE

## 2023-02-07 VITALS
BODY MASS INDEX: 24.91 KG/M2 | DIASTOLIC BLOOD PRESSURE: 64 MMHG | HEART RATE: 89 BPM | TEMPERATURE: 99 F | RESPIRATION RATE: 16 BRPM | OXYGEN SATURATION: 95 % | WEIGHT: 135.38 LBS | SYSTOLIC BLOOD PRESSURE: 120 MMHG | HEIGHT: 62 IN

## 2023-02-07 DIAGNOSIS — M06.09 RHEUMATOID ARTHRITIS OF MULTIPLE SITES WITHOUT RHEUMATOID FACTOR (HCC): Primary | ICD-10-CM

## 2023-02-07 DIAGNOSIS — M06.09 SERONEGATIVE RHEUMATOID ARTHRITIS OF MULTIPLE SITES (HCC): ICD-10-CM

## 2023-02-07 DIAGNOSIS — D46.9 MYELODYSPLASTIC SYNDROME (HCC): ICD-10-CM

## 2023-02-07 DIAGNOSIS — M17.0 PRIMARY OSTEOARTHRITIS OF BOTH KNEES: ICD-10-CM

## 2023-02-07 PROBLEM — K12.1 MOUTH ULCERS: Status: RESOLVED | Noted: 2020-08-17 | Resolved: 2023-02-07

## 2023-02-07 PROCEDURE — 99214 OFFICE O/P EST MOD 30 MIN: CPT | Performed by: INTERNAL MEDICINE

## 2023-02-07 RX ORDER — SULFASALAZINE 500 MG/1
1000 TABLET ORAL 2 TIMES DAILY
Qty: 360 TABLET | Refills: 1 | Status: SHIPPED | OUTPATIENT
Start: 2023-02-07

## 2023-02-07 RX ORDER — METHOTREXATE 2.5 MG/1
20 TABLET ORAL WEEKLY
Qty: 96 TABLET | Refills: 3 | Status: SHIPPED | OUTPATIENT
Start: 2023-02-07 | End: 2023-03-07

## 2023-02-07 NOTE — PATIENT INSTRUCTIONS
Continue methotrexate 8 tablets/week for on Saturday 4 on Sunday. Folic acid 1 mg a day. Plaquenil 200 mg twice a day. Sulfasalazine 500 mg twice a day. Prednisone 5 mg/day. Extra strength Tylenol for pain 500 mg 1 or 2 a day. Occasional tramadol for pain 50 mg. Do labs with your primary care physician at this time. Have the results sent to me. Return to office 4 months.

## 2023-06-06 ENCOUNTER — TELEPHONE (OUTPATIENT)
Dept: RHEUMATOLOGY | Facility: CLINIC | Age: 73
End: 2023-06-06

## 2023-06-06 ENCOUNTER — VIRTUAL PHONE E/M (OUTPATIENT)
Dept: RHEUMATOLOGY | Facility: CLINIC | Age: 73
End: 2023-06-06
Payer: MEDICARE

## 2023-06-06 DIAGNOSIS — M06.09 SERONEGATIVE RHEUMATOID ARTHRITIS OF MULTIPLE SITES (HCC): Primary | ICD-10-CM

## 2023-06-06 DIAGNOSIS — D46.9 MYELODYSPLASTIC SYNDROME (HCC): ICD-10-CM

## 2023-06-06 DIAGNOSIS — M17.0 PRIMARY OSTEOARTHRITIS OF BOTH KNEES: ICD-10-CM

## 2023-06-06 NOTE — TELEPHONE ENCOUNTER
Called to check in pt for upcoming phone visit for 6/6/2023 at 11:40 am. Unable to leave voice message due to voice mail box not set up at this time. Unable to check in pt and provide instructions for phone visit. MyChart not set up at this time.

## 2023-06-06 NOTE — PROGRESS NOTES
Virtual Telephone Check-In    Windy Marie verbally consents to a Virtual/Telephone Check-In visit on 06/06/23. Patient has been referred to the [facility] website at [email or web address] to review the yearly Consent to Treat document. Patient understands and accepts financial responsibility for any deductible, co-insurance and/or co-pays associated with this service. Duration of the service: NO show. Summary of topics discussed: Rheumatoid Arthritis. OA Knees. No show. No appointment today. Did not answer phone call.       Choco Gordon MD

## 2023-07-26 DIAGNOSIS — M06.09 RHEUMATOID ARTHRITIS OF MULTIPLE SITES WITHOUT RHEUMATOID FACTOR (HCC): ICD-10-CM

## 2023-07-26 DIAGNOSIS — D46.9 MYELODYSPLASTIC SYNDROME (HCC): ICD-10-CM

## 2023-07-26 DIAGNOSIS — M17.0 PRIMARY OSTEOARTHRITIS OF BOTH KNEES: ICD-10-CM

## 2023-07-26 DIAGNOSIS — M06.09 SERONEGATIVE RHEUMATOID ARTHRITIS OF MULTIPLE SITES (HCC): ICD-10-CM

## 2023-07-26 RX ORDER — SULFASALAZINE 500 MG/1
1000 TABLET ORAL 2 TIMES DAILY
Qty: 360 TABLET | Refills: 1 | Status: SHIPPED | OUTPATIENT
Start: 2023-07-26

## 2023-07-26 NOTE — TELEPHONE ENCOUNTER
Pt sister called to set up follow up appointment. She stated that pt is out of sulfasalazine. Pended medication to correct pharmacy.     Call sister with any questions    Future Appointments   Date Time Provider Radha Ragsdale   9/14/1742  1:97 PM Melva Jain MD [facility]     Last fill: 2/7/2023 360 tab, 1 refill

## 2023-08-01 DIAGNOSIS — D46.9 MYELODYSPLASTIC SYNDROME (HCC): ICD-10-CM

## 2023-08-01 DIAGNOSIS — M17.0 PRIMARY OSTEOARTHRITIS OF BOTH KNEES: ICD-10-CM

## 2023-08-01 DIAGNOSIS — M06.09 SERONEGATIVE RHEUMATOID ARTHRITIS OF MULTIPLE SITES (HCC): ICD-10-CM

## 2023-08-02 RX ORDER — GABAPENTIN 300 MG/1
CAPSULE ORAL
Qty: 180 CAPSULE | Refills: 1 | Status: SHIPPED | OUTPATIENT
Start: 2023-08-02

## 2023-08-02 RX ORDER — FOLIC ACID 1 MG/1
1 TABLET ORAL DAILY
Qty: 90 TABLET | Refills: 3 | OUTPATIENT
Start: 2023-08-02

## 2023-08-02 NOTE — TELEPHONE ENCOUNTER
Future Appointments   Date Time Provider Radha Ragsdale   9/31/2940  9:67 PM Lupe Roque MD [facility]     Last OV: 2/7/23  Last Fill: 12/7/22, 180 tabs, 1 refills

## 2023-09-26 ENCOUNTER — TELEPHONE (OUTPATIENT)
Dept: RHEUMATOLOGY | Facility: CLINIC | Age: 73
End: 2023-09-26

## 2023-09-28 ENCOUNTER — OFFICE VISIT (OUTPATIENT)
Dept: RHEUMATOLOGY | Facility: CLINIC | Age: 73
End: 2023-09-28
Payer: MEDICARE

## 2023-09-28 VITALS
DIASTOLIC BLOOD PRESSURE: 58 MMHG | BODY MASS INDEX: 25.1 KG/M2 | OXYGEN SATURATION: 99 % | WEIGHT: 136.38 LBS | HEIGHT: 62 IN | TEMPERATURE: 97 F | RESPIRATION RATE: 16 BRPM | HEART RATE: 74 BPM | SYSTOLIC BLOOD PRESSURE: 120 MMHG

## 2023-09-28 DIAGNOSIS — M06.09 RHEUMATOID ARTHRITIS OF MULTIPLE SITES WITHOUT RHEUMATOID FACTOR (HCC): ICD-10-CM

## 2023-09-28 DIAGNOSIS — M06.09 SERONEGATIVE RHEUMATOID ARTHRITIS OF MULTIPLE SITES (HCC): ICD-10-CM

## 2023-09-28 DIAGNOSIS — M17.0 PRIMARY OSTEOARTHRITIS OF BOTH KNEES: ICD-10-CM

## 2023-09-28 DIAGNOSIS — D61.818 PANCYTOPENIA (HCC): Primary | ICD-10-CM

## 2023-09-28 DIAGNOSIS — D46.9 MYELODYSPLASTIC SYNDROME (HCC): ICD-10-CM

## 2023-09-28 PROCEDURE — 99214 OFFICE O/P EST MOD 30 MIN: CPT | Performed by: INTERNAL MEDICINE

## 2023-09-28 RX ORDER — FOLIC ACID 1 MG/1
1 TABLET ORAL DAILY
Qty: 90 TABLET | Refills: 3 | Status: SHIPPED | OUTPATIENT
Start: 2023-09-28

## 2023-09-28 RX ORDER — SULFASALAZINE 500 MG/1
1000 TABLET ORAL 2 TIMES DAILY
Qty: 360 TABLET | Refills: 1 | Status: SHIPPED | OUTPATIENT
Start: 2023-09-28

## 2023-09-28 RX ORDER — BENZONATATE 100 MG/1
100 CAPSULE ORAL 3 TIMES DAILY
COMMUNITY
Start: 2023-09-13

## 2023-09-28 RX ORDER — GABAPENTIN 300 MG/1
CAPSULE ORAL
Qty: 180 CAPSULE | Refills: 1 | Status: SHIPPED | OUTPATIENT
Start: 2023-09-28

## 2023-09-28 RX ORDER — HYDROXYCHLOROQUINE SULFATE 200 MG/1
200 TABLET, FILM COATED ORAL 2 TIMES DAILY
Qty: 180 TABLET | Refills: 3 | Status: SHIPPED | OUTPATIENT
Start: 2023-09-28

## 2023-09-28 RX ORDER — TRAMADOL HYDROCHLORIDE 50 MG/1
TABLET ORAL 2 TIMES DAILY PRN
Qty: 60 TABLET | Refills: 2 | Status: SHIPPED | OUTPATIENT
Start: 2023-09-28

## 2023-09-28 RX ORDER — LOSARTAN POTASSIUM 50 MG/1
50 TABLET ORAL DAILY
COMMUNITY
Start: 2022-08-22

## 2023-09-28 RX ORDER — LEVOCETIRIZINE DIHYDROCHLORIDE 5 MG/1
5 TABLET, FILM COATED ORAL EVERY EVENING
Qty: 90 TABLET | Refills: 1 | Status: SHIPPED | OUTPATIENT
Start: 2023-09-28

## 2023-09-28 RX ORDER — CODEINE PHOSPHATE AND GUAIFENESIN 10; 100 MG/5ML; MG/5ML
5 SOLUTION ORAL
COMMUNITY
Start: 2023-09-13

## 2023-09-28 NOTE — PATIENT INSTRUCTIONS
For rheumatoid arthritis continue methotrexate 8 tablets/week. Folic acid 1 mg a day. Sulfasalazine 500 mg 2 tablets twice a day. Plaquenil 200 mg twice a day. Xyzal ordered 5 mg once a day for itching. Gabapentin 300 mg twice a day for chronic pain. Eye exam once a year since on Plaquenil. Current labs are stable but you do have slightly low white count, slightly low red count, and slightly low platelets. This is because you have myelodysplasia a bone marrow issue. Use extra strength Tylenol 500 mg once or twice a day for pain. For more severe pain take tramadol 50 mg once or twice a day. Do your lab tests at least once every 4 months with your primary. Return to see Dr. Jignesh Rothman in 6 months.

## 2024-03-04 ENCOUNTER — OFFICE VISIT (OUTPATIENT)
Dept: RHEUMATOLOGY | Facility: CLINIC | Age: 74
End: 2024-03-04
Payer: MEDICARE

## 2024-03-04 VITALS
HEIGHT: 62 IN | SYSTOLIC BLOOD PRESSURE: 110 MMHG | OXYGEN SATURATION: 98 % | HEART RATE: 91 BPM | DIASTOLIC BLOOD PRESSURE: 50 MMHG | BODY MASS INDEX: 24.48 KG/M2 | WEIGHT: 133 LBS | TEMPERATURE: 98 F

## 2024-03-04 DIAGNOSIS — E55.9 VITAMIN D DEFICIENCY: ICD-10-CM

## 2024-03-04 DIAGNOSIS — D46.9 MYELODYSPLASTIC SYNDROME (HCC): ICD-10-CM

## 2024-03-04 DIAGNOSIS — M06.09 SERONEGATIVE RHEUMATOID ARTHRITIS OF MULTIPLE SITES (HCC): Primary | ICD-10-CM

## 2024-03-04 DIAGNOSIS — M17.0 PRIMARY OSTEOARTHRITIS OF BOTH KNEES: ICD-10-CM

## 2024-03-04 DIAGNOSIS — R53.82 CHRONIC FATIGUE: ICD-10-CM

## 2024-03-04 DIAGNOSIS — M06.09 RHEUMATOID ARTHRITIS OF MULTIPLE SITES WITHOUT RHEUMATOID FACTOR (HCC): ICD-10-CM

## 2024-03-04 DIAGNOSIS — R42 DIZZINESS: ICD-10-CM

## 2024-03-04 PROCEDURE — 99214 OFFICE O/P EST MOD 30 MIN: CPT | Performed by: INTERNAL MEDICINE

## 2024-03-04 RX ORDER — HYDROXYCHLOROQUINE SULFATE 200 MG/1
200 TABLET, FILM COATED ORAL 2 TIMES DAILY
Qty: 180 TABLET | Refills: 3 | Status: SHIPPED | OUTPATIENT
Start: 2024-03-04

## 2024-03-04 RX ORDER — FOLIC ACID 1 MG/1
1 TABLET ORAL DAILY
Qty: 90 TABLET | Refills: 3 | Status: SHIPPED | OUTPATIENT
Start: 2024-03-04

## 2024-03-04 RX ORDER — METHOTREXATE 2.5 MG/1
20 TABLET ORAL WEEKLY
Qty: 72 TABLET | Refills: 3 | Status: SHIPPED | OUTPATIENT
Start: 2024-03-04

## 2024-03-04 RX ORDER — ATORVASTATIN CALCIUM 10 MG/1
10 TABLET, FILM COATED ORAL NIGHTLY
COMMUNITY
Start: 2024-02-20

## 2024-03-04 RX ORDER — TRAMADOL HYDROCHLORIDE 50 MG/1
TABLET ORAL 2 TIMES DAILY PRN
Qty: 60 TABLET | Refills: 2 | Status: SHIPPED | OUTPATIENT
Start: 2024-03-04

## 2024-03-04 RX ORDER — SULFASALAZINE 500 MG/1
1000 TABLET ORAL 2 TIMES DAILY
Qty: 360 TABLET | Refills: 1 | Status: SHIPPED | OUTPATIENT
Start: 2024-03-04

## 2024-03-04 RX ORDER — TRIAMCINOLONE ACETONIDE 1 MG/G
CREAM TOPICAL 2 TIMES DAILY PRN
Qty: 60 G | Refills: 3 | Status: SHIPPED | OUTPATIENT
Start: 2024-03-04

## 2024-03-04 NOTE — PROGRESS NOTES
[facility]  Dr. Palacio Progress Note     Subjective:   Alysa Aguero is a(n) 73 year old female.   Current complaints:   Chief Complaint   Patient presents with    Rheumatoid Arthritis   History of RA, [place] speaking patient.    Feeling so so.  Almos t falls down at tines.  Gets dizzy.  Can't cook.  Gets tired easily.  Rests a lot.  Goint to primary Dr. Heredia in 2 weeks.     Lazy eating.  No energy.  Office visit conducted with use of [place] .    Objective:   /50 (BP Location: Right arm, Patient Position: Sitting, Cuff Size: adult)   Pulse 91   Temp 98 °F (36.7 °C)   Ht 5' 2\" (1.575 m)   Wt 133 lb (60.3 kg)   SpO2 98%   BMI 24.33 kg/m²   Lungs clear  Heart nsr  Hands  non tender  Left middle finger swan neck deformity  Right thumb spoon deformity - ip deformed  Knees ok   No tender joints.   9/13/23  Hemoglobin 11.0  Hematocrit  33.6  WBC  2,67  Plats 119,000 .  CRP <4.0  BUN   12    TJC  0  SJC  0   PtGA  2  MDAGF  2  RA CDAI  4  Assessment:     Encounter Diagnoses   Name Primary?    Seronegative rheumatoid arthritis of multiple sites (HCC) Yes    Rheumatoid arthritis of multiple sites without rheumatoid factor (HCC)     Primary osteoarthritis of both knees     Myelodysplastic syndrome (HCC)     Vitamin D deficiency     Chronic fatigue     Dizziness      Plan:     Patient Instructions   See primary care physician Dr. Heredia in 2 weeks and have lab test done check for fatigue and dizziness.  Arthritis complaints are minimal I do not think your arthritis is acting up.  Continue methotrexate 8 tablets/week for the rheumatoid arthritis, folic acid 1 mg a day, sulfasalazine 500 mg 2 tablets twice a day and Plaquenil 200 mg twice a day.  Eye exam yearly when on Plaquenil.  Lab testing every 3 to 4 months due to use of methotrexate.  Again see your primary for checkup in 2 weeks.  Drink plenty of fluids sometimes that would help with dizziness occasions due to being hydrated.  Return to  office for recheck 4 months.        Yovany Palacio MD 3/4/2024 1:31 PM

## 2024-03-04 NOTE — PATIENT INSTRUCTIONS
See I could not the  actions was pretty good with the English which she was and then I could not understand anything a  as well as  English but that the vitamin D is language a lot of of my head that she had helpful and she there is acute acute couple primary care physician Dr. Heredia in 2 weeks and have lab test done check for fatigue and dizziness.  Arthritis complaints are minimal I do not think your arthritis is acting up.  Continue methotrexate 8 tablets/week for the rheumatoid arthritis, folic acid 1 mg a day, sulfasalazine 500 mg 2 tablets twice a day and Plaquenil 200 mg twice a day.  Eye exam yearly when on Plaquenil.  Lab testing every 3 to 4 months due to use of methotrexate.  Again see your primary for checkup in 2 weeks.  Drink plenty of fluids sometimes that would help with dizziness occasions due to being hydrated.  Return to office for recheck 4 months.

## 2024-05-10 ENCOUNTER — TELEPHONE (OUTPATIENT)
Dept: RHEUMATOLOGY | Facility: CLINIC | Age: 74
End: 2024-05-10

## 2024-05-10 NOTE — TELEPHONE ENCOUNTER
Dr. Supa Santoyo phoned office, [facility] while taking Hydroxychloroquine. Pt is trying to return to the [facility] and would like to see Dr. Palacio once she returns. Pt expected to return 5/23 and Dr. Santoyo is requesting appt as soon as possible. His cellphone# [phone].

## 2024-05-20 ENCOUNTER — TELEPHONE (OUTPATIENT)
Dept: RHEUMATOLOGY | Facility: CLINIC | Age: 74
End: 2024-05-20

## 2024-05-30 ENCOUNTER — OFFICE VISIT (OUTPATIENT)
Dept: RHEUMATOLOGY | Facility: CLINIC | Age: 74
End: 2024-05-30

## 2024-05-30 VITALS
OXYGEN SATURATION: 99 % | TEMPERATURE: 98 F | DIASTOLIC BLOOD PRESSURE: 56 MMHG | BODY MASS INDEX: 23 KG/M2 | WEIGHT: 125 LBS | HEIGHT: 62 IN | HEART RATE: 55 BPM | RESPIRATION RATE: 16 BRPM | SYSTOLIC BLOOD PRESSURE: 104 MMHG

## 2024-05-30 DIAGNOSIS — M06.09 RHEUMATOID ARTHRITIS OF MULTIPLE SITES WITHOUT RHEUMATOID FACTOR (HCC): ICD-10-CM

## 2024-05-30 DIAGNOSIS — M06.09 SERONEGATIVE RHEUMATOID ARTHRITIS OF MULTIPLE SITES (HCC): ICD-10-CM

## 2024-05-30 DIAGNOSIS — D46.9 MYELODYSPLASTIC SYNDROME (HCC): ICD-10-CM

## 2024-05-30 DIAGNOSIS — M17.0 PRIMARY OSTEOARTHRITIS OF BOTH KNEES: ICD-10-CM

## 2024-05-30 PROCEDURE — 99214 OFFICE O/P EST MOD 30 MIN: CPT | Performed by: INTERNAL MEDICINE

## 2024-05-30 RX ORDER — PREDNISONE 5 MG/1
5 TABLET ORAL DAILY
Qty: 90 TABLET | Refills: 1 | Status: SHIPPED | OUTPATIENT
Start: 2024-05-30

## 2024-05-30 RX ORDER — TRAMADOL HYDROCHLORIDE 50 MG/1
50 TABLET ORAL 3 TIMES DAILY PRN
Qty: 90 TABLET | Refills: 2 | Status: SHIPPED | OUTPATIENT
Start: 2024-05-30

## 2024-05-30 RX ORDER — HYDROXYCHLOROQUINE SULFATE 200 MG/1
200 TABLET, FILM COATED ORAL 2 TIMES DAILY
Qty: 180 TABLET | Refills: 3 | Status: CANCELLED
Start: 2024-05-30

## 2024-05-30 RX ORDER — FOLIC ACID 1 MG/1
1 TABLET ORAL DAILY
Qty: 90 TABLET | Refills: 3 | Status: CANCELLED
Start: 2024-05-30

## 2024-05-30 RX ORDER — SULFASALAZINE 500 MG/1
1000 TABLET ORAL 2 TIMES DAILY
Qty: 360 TABLET | Refills: 1 | Status: CANCELLED
Start: 2024-05-30

## 2024-05-30 RX ORDER — METHOTREXATE 2.5 MG/1
20 TABLET ORAL WEEKLY
Qty: 72 TABLET | Refills: 3 | Status: CANCELLED
Start: 2024-05-30

## 2024-05-30 NOTE — PROGRESS NOTES
[facility]  Dr. Palacio Progress Note     Subjective:   Alysa Aguero is a(n) 74 year old female.   Current complaints:   Chief Complaint   Patient presents with    Rheumatoid Arthritis    Follow - Up     Was in the hospital for about 5 weeks. Got out about 4 days ago. Some pain in hand/finger joints but not too much. Today is 5/10   Hospitalized in [place] recently for 5 weeks. Passed out in [place], multiple issues. Was sob, one lung is diseased,  Had kidney issues.  They stopped her RA medications.    History of RA.  Was on Methotrexate, Sulfasalazine, Plaquenil - all of these have been stopped. History of Diabetes Mellitus and Hypertension.   No joint swelling.  Pain level 5 /10. Pain in wrists.  Hard to get out of bed in the morning.    Today achy hands and wrists.  No severe swelling.  Syrian speaking patient therefore we used the Syrian   service..  Objective:   /56 (BP Location: Right arm, Patient Position: Sitting, Cuff Size: adult)   Pulse 55   Temp 98 °F (36.7 °C)   Resp 16   Ht 5' 2\" (1.575 m)   Wt 125 lb (56.7 kg)   SpO2 99%   BMI 22.86 kg/m²   Lungs are clear.  Heart normal sinus rhythm.  Fingers are deformed.  Decreased handgrip.  Tender wrist bilaterally no severe swelling.  Fingers slight tenderness in the PIP joints.  Knees negative.  No leg edema.  5/28/2024 white count 6.6 hemoglobin 9.1 hematocrit 28.9  platelet count 112,000.  Iron 212 TIBC 226 saturation 93%  Vitamin B12  - 1983.  Folate 6.4   RF negative    ESR  63  BUN  28  CRT  1.9  Hb A1C  5.8    TJC  4  SJC 0  PtGA  5  MDGA  3  RA CDAI  12  Assessment:     Encounter Diagnoses   Name Primary?    Seronegative rheumatoid arthritis of multiple sites (HCC)     Rheumatoid arthritis of multiple sites without rheumatoid factor (HCC)     Primary osteoarthritis of both knees     Myelodysplastic syndrome (HCC)      Plan:     Patient Instructions   Current advice-for rheumatoid arthritis take the prednisone 5  mg once in the morning.  Later in the day take tramadol for pain 50 mg up to 3/day.  Do not use methotrexate, do not use meth sulfasalazine, do not use Plaquenil.  These medicines cause a bad medication side effect.  Therefore we will just stick with prednisone and tramadol at this time.  Also use a multivitamin 1 a day.  Continue to follow-up to the office in 3 months  Also see your primary doctor Dr. Santoyo.        Yovany Palacio MD 5/30/2024 11:41 AM

## 2024-05-30 NOTE — PATIENT INSTRUCTIONS
Current advice-for rheumatoid arthritis take the prednisone 5 mg once in the morning.  Later in the day take tramadol for pain 50 mg up to 3/day.  Do not use methotrexate, do not use meth sulfasalazine, do not use Plaquenil.  These medicines cause a bad medication side effect.  Therefore we will just stick with prednisone and tramadol at this time.  Also use a multivitamin 1 a day.  Continue to follow-up to the office in 3 months  Also see your primary doctor Dr. Santoyo.

## 2024-09-05 ENCOUNTER — OFFICE VISIT (OUTPATIENT)
Dept: RHEUMATOLOGY | Facility: CLINIC | Age: 74
End: 2024-09-05
Payer: MEDICARE

## 2024-09-05 VITALS
RESPIRATION RATE: 16 BRPM | WEIGHT: 131.63 LBS | HEART RATE: 95 BPM | SYSTOLIC BLOOD PRESSURE: 118 MMHG | HEIGHT: 62 IN | DIASTOLIC BLOOD PRESSURE: 52 MMHG | BODY MASS INDEX: 24.22 KG/M2 | OXYGEN SATURATION: 95 % | TEMPERATURE: 97 F

## 2024-09-05 DIAGNOSIS — D46.9 MYELODYSPLASTIC SYNDROME (HCC): ICD-10-CM

## 2024-09-05 DIAGNOSIS — M06.09 SERONEGATIVE RHEUMATOID ARTHRITIS OF MULTIPLE SITES (HCC): Primary | ICD-10-CM

## 2024-09-05 DIAGNOSIS — D63.8 ANEMIA IN OTHER CHRONIC DISEASES CLASSIFIED ELSEWHERE: ICD-10-CM

## 2024-09-05 DIAGNOSIS — D61.818 PANCYTOPENIA (HCC): ICD-10-CM

## 2024-09-05 PROCEDURE — 99214 OFFICE O/P EST MOD 30 MIN: CPT | Performed by: INTERNAL MEDICINE

## 2024-09-05 RX ORDER — LEFLUNOMIDE 20 MG/1
20 TABLET ORAL DAILY
Qty: 30 TABLET | Refills: 2 | Status: SHIPPED | OUTPATIENT
Start: 2024-09-05

## 2024-09-05 RX ORDER — PREDNISONE 5 MG/1
10 TABLET ORAL DAILY
Qty: 180 TABLET | Refills: 1 | Status: SHIPPED | OUTPATIENT
Start: 2024-09-05

## 2024-09-05 NOTE — PROGRESS NOTES
[facility]  Dr. Palacio Progress Note     Subjective:   Alysa Aguero is a(n) 74 year old female.   Current complaints:   Chief Complaint   Patient presents with    Follow - Up     LOV 5/30/2024  Patient states some days are better than others. Her pain is much worse in the morning and is mostly in her hands and legs 5/10. It is hard for patient to pick things up with her hands.  Rapid 3 score 3.3.   History of chronic rheumatoid arthritis.  Also history of diabetes and hypertension.  In the spring in [place] had a episode of passing out.  All medications were changed.  Last visit the methotrexate and Plaquenil were stopped.  Patient was just using prednisone 5 mg/day.  Hands are painful.  Hands and wrists   Back pain also.    Objective:   /52   Pulse 95   Temp 97.3 °F (36.3 °C)   Resp 16   Ht 5' 2\" (1.575 m)   Wt 131 lb 9.6 oz (59.7 kg)   SpO2 95%   BMI 24.07 kg/m²   Hands tender mcps and pips   wrists tender   Elbows ok  Knees ok   9/3/2024 C-reactive protein 35.9 normal is less than 10 done at [place] lab.  White count 3.8 hemoglobin 11.3 hematocrit 34.9  platelet count 106,000.  Glucose 88 sodium 137 potassium 4.6 chloride 107 BUN 16 creatinine 1.0 total protein 7.3 albumin 3.7 globulin 3.6  Calcium 9.7 total bilirubin 1.2 alkaline phos 93 SGOT 38 SGPT 15 GFR 58  5/28/24  RF negative    sed rate 63    TJC  10   SJC  0   PtGA  4  MDGA  4  RA CDAI  18  Assessment:     Encounter Diagnoses   Name Primary?    Seronegative rheumatoid arthritis of multiple sites (HCC) Yes    Pancytopenia (HCC)     Myelodysplastic syndrome (HCC)     Anemia in other chronic diseases classified elsewhere    RA subacute activity.    Plan:     Patient Instructions   Add Leflunomide for RA - 20 mg once a day.  Prednisone 5 mg dose generally one per day, occasionally two per day  Off Methotrexate, off Plaquenil, Off sulfasalazine.  Norco for pain one or two per day,  Recheck labs before next visit.  Return visit in  3 months.        Yovany Palacio MD 9/5/2024 2:10 PM

## 2024-09-05 NOTE — PATIENT INSTRUCTIONS
Add Leflunomide for RA - 20 mg once a day.  Prednisone 5 mg dose generally one per day, occasionally two per day  Off Methotrexate, off Plaquenil, Off sulfasalazine.  Norco for pain one or two per day,  Recheck labs before next visit.  Return visit in 3 months.

## 2024-11-26 RX ORDER — LEFLUNOMIDE 20 MG/1
20 TABLET ORAL DAILY
Qty: 90 TABLET | Refills: 0 | Status: SHIPPED | OUTPATIENT
Start: 2024-11-26

## 2024-11-26 NOTE — TELEPHONE ENCOUNTER
Last office visit: 9/5/2024     Next Rheum Apt:12/11/2024 Yovany Palacio MD    Last fill: 9/5/2024   30 tabs, 2 refills     Labs:   Lab Results   Component Value Date    CREATSERUM 1.30 (H) 08/30/2021    GFR >59 10/23/2010    ALKPHO 56 08/30/2021    AST 34 08/30/2021    ALT 20 08/30/2021    BILT 0.70 08/30/2021    TP 6.9 08/30/2021    TP 7.0 08/30/2021    ALB 3.2 (L) 08/30/2021       Lab Results   Component Value Date    WBC 4.91 08/30/2021    HGB 10.1 (L) 08/30/2021    PLT 85 (L) 08/30/2021    NEPRELIM 3.02 08/07/2018    NEUTABS 2.26 12/14/2016    LYMPHABS 1.96 12/14/2016    NEPERCENT 58.8 08/30/2021    LYPERCENT 20.6 08/30/2021    NE 2.89 08/30/2021    LYMABS 1.01 08/30/2021

## 2024-12-11 ENCOUNTER — OFFICE VISIT (OUTPATIENT)
Dept: RHEUMATOLOGY | Facility: CLINIC | Age: 74
End: 2024-12-11
Payer: MEDICARE

## 2024-12-11 VITALS
HEART RATE: 92 BPM | DIASTOLIC BLOOD PRESSURE: 76 MMHG | HEIGHT: 62 IN | WEIGHT: 133 LBS | BODY MASS INDEX: 24.48 KG/M2 | SYSTOLIC BLOOD PRESSURE: 144 MMHG | TEMPERATURE: 97 F | OXYGEN SATURATION: 96 % | RESPIRATION RATE: 16 BRPM

## 2024-12-11 DIAGNOSIS — D69.6 THROMBOCYTOPENIA (HCC): Primary | ICD-10-CM

## 2024-12-11 DIAGNOSIS — M06.09 SERONEGATIVE RHEUMATOID ARTHRITIS OF MULTIPLE SITES (HCC): ICD-10-CM

## 2024-12-11 DIAGNOSIS — D46.9 MYELODYSPLASTIC SYNDROME (HCC): ICD-10-CM

## 2024-12-11 DIAGNOSIS — M17.0 PRIMARY OSTEOARTHRITIS OF BOTH KNEES: ICD-10-CM

## 2024-12-11 DIAGNOSIS — M06.09 RHEUMATOID ARTHRITIS OF MULTIPLE SITES WITHOUT RHEUMATOID FACTOR (HCC): ICD-10-CM

## 2024-12-11 PROCEDURE — 99214 OFFICE O/P EST MOD 30 MIN: CPT | Performed by: INTERNAL MEDICINE

## 2024-12-11 RX ORDER — LEFLUNOMIDE 20 MG/1
20 TABLET ORAL DAILY
Qty: 90 TABLET | Refills: 0 | Status: SHIPPED | OUTPATIENT
Start: 2024-12-11

## 2024-12-11 RX ORDER — HYDROXYCHLOROQUINE SULFATE 200 MG/1
200 TABLET, FILM COATED ORAL 2 TIMES DAILY
Qty: 60 TABLET | Refills: 5 | Status: SHIPPED | OUTPATIENT
Start: 2024-12-11

## 2024-12-11 RX ORDER — TRIAMCINOLONE ACETONIDE 1 MG/G
CREAM TOPICAL 2 TIMES DAILY PRN
Qty: 454 G | Refills: 1 | Status: SHIPPED | OUTPATIENT
Start: 2024-12-11

## 2024-12-11 RX ORDER — TRAMADOL HYDROCHLORIDE 50 MG/1
50 TABLET ORAL 3 TIMES DAILY PRN
Qty: 90 TABLET | Refills: 2 | Status: SHIPPED | OUTPATIENT
Start: 2024-12-11

## 2024-12-11 NOTE — PROGRESS NOTES
[facility]  Dr. Palacio Progress Note     Subjective:   Alysa Aguero is a(n) 74 year old female.   Current complaints:   Chief Complaint   Patient presents with    Rheumatoid Arthritis    Follow - Up     LOV: 9/5/24  Patient is here for a follow up visit. Patient states that when she was in the hospital recently, they stopped prednisone and methotrexate. Pain in wrists (4/10), otherwise fine.   Rapid 3:    History of seronegative RA.  Has wrist pain.    Recent hospitalization, [facility].  [place] poor and couldn't walk.     Currently off prednisone and methotrexate.  Recent lab showed low platelets.  Also mild hemoglobin drop.  Now feeling ok  On gabapentin 100 mg twice a day, On Hydroxychloroquuine 200 mg twice a day.  Leflunomide 20 mg per day.   Objective:   /76   Pulse 92   Temp 96.8 °F (36 °C)   Resp 16   Ht 5' 2\" (1.575 m)   Wt 133 lb (60.3 kg)   SpO2 96%   BMI 24.33 kg/m²   Lungs clear  Heart nsr   Hands ok  Wrists trace tender  Shoulder ok  Knees ok   Dry skin lower legs   11/20/24  [place]  CBC WBC 3.0 hemoglobin 10.9 hematocrit 34.5 platelet count 65,000  10/19/2024 platelet count 17,000.  TSH is 0.67.  TJC  2  SJC  0   PtGA  2  MDGA  2  RA CDAI  6    Assessment:     Encounter Diagnoses   Name Primary?    Seronegative rheumatoid arthritis of multiple sites (HCC)     Rheumatoid arthritis of multiple sites without rheumatoid factor (HCC)     Primary osteoarthritis of both knees     Myelodysplastic syndrome (HCC)     Thrombocytopenia (HCC) Yes   SeroNegative RA stable.  Plan:     Patient Instructions   Recently found to have low platelets probably due to methotrexate.  Methotrexate discontinued by physicians at [place] when she was in the hospital.  Also off of prednisone now.  Currently feeling okay.  Therefore no methotrexate.  No prednisone at this time.  Use hydroxychloroquine 200 mg twice a day.  Take leflunomide 20 mg daily.  Triamcinolone cream ordered for comfortable  itching slight rash to lower legs.  Tramadol 50 mg as needed for pain once or twice a day.  Eye exam once a year while on hydroxychloroquine.  Lab test by primary physician every 3 to 4 months.  Platelets were low at 67,000.  Hemoglobin was 10.9 in November.  These need to be rechecked by primary.  Return to office for recheck in the spring 2025.        Yovany Palacio MD 12/11/2024 11:54 AM

## 2024-12-11 NOTE — PATIENT INSTRUCTIONS
Recently found to have low platelets probably due to methotrexate.  Methotrexate discontinued by physicians at [place] when she was in the hospital.  Also off of prednisone now.  Currently feeling okay.  Therefore no methotrexate.  No prednisone at this time.  Use hydroxychloroquine 200 mg twice a day.  Take leflunomide 20 mg daily.  Triamcinolone cream ordered for comfortable itching slight rash to lower legs.  Tramadol 50 mg as needed for pain once or twice a day.  Eye exam once a year while on hydroxychloroquine.  Lab test by primary physician every 3 to 4 months.  Platelets were low at 67,000.  Hemoglobin was 10.9 in November.  These need to be rechecked by primary.  Return to office for recheck in the spring 2025.

## (undated) DIAGNOSIS — M17.0 PRIMARY OSTEOARTHRITIS OF BOTH KNEES: ICD-10-CM

## (undated) DIAGNOSIS — M06.09 SERONEGATIVE RHEUMATOID ARTHRITIS OF MULTIPLE SITES (HCC): ICD-10-CM

## (undated) DIAGNOSIS — D46.9 MYELODYSPLASTIC SYNDROME (HCC): ICD-10-CM

## (undated) NOTE — MR AVS SNAPSHOT
[facility]  [address] [facility],[address], [address]  [address] [place] [address] [phone]               Thank you for choosing us for your health care visit with Rogelio Garcia MD.  We are glad to serve you and happy to provide you with this Jan 25, 2017  2:00 PM   FOLLOW UP with Radha Carrion MD   [facility] [place] [address]   [phone]              Allergies as of Jan 06, 2017     Seasonal Itching Educational Information     Your blood pressure indicates you may be at-risk for Hypertension. Please consider the following Lifestyle Modifications. Also, please return for a follow-up Blood Pressure Check in 1 year.      Lifestyle Modification Recommen

## (undated) NOTE — MR AVS SNAPSHOT
[facility]  [address] [facility],Fourth [address], [address]  [address] [place] [address] [phone]               Thank you for choosing us for your health care visit with Sallee Barthel, MD.  We are glad to serve you and happy to provide you with this Commonly known as:  LIPITOR           Docosanol 10 % Crea   Apply to the affected area on the lip BID   Commonly known as:  ABREVA           folic acid 1 MG Tabs   Take 2 tablets (2 mg total) by mouth once daily.    Commonly known as:  Tatum Vieira